# Patient Record
Sex: MALE | Race: ASIAN | NOT HISPANIC OR LATINO
[De-identification: names, ages, dates, MRNs, and addresses within clinical notes are randomized per-mention and may not be internally consistent; named-entity substitution may affect disease eponyms.]

---

## 2021-12-30 PROBLEM — Z00.00 ENCOUNTER FOR PREVENTIVE HEALTH EXAMINATION: Status: ACTIVE | Noted: 2021-12-30

## 2022-01-05 ENCOUNTER — APPOINTMENT (OUTPATIENT)
Dept: ORTHOPEDIC SURGERY | Facility: CLINIC | Age: 72
End: 2022-01-05
Payer: MEDICARE

## 2022-01-05 ENCOUNTER — TRANSCRIPTION ENCOUNTER (OUTPATIENT)
Age: 72
End: 2022-01-05

## 2022-01-05 VITALS
HEIGHT: 69 IN | DIASTOLIC BLOOD PRESSURE: 90 MMHG | HEART RATE: 79 BPM | SYSTOLIC BLOOD PRESSURE: 150 MMHG | BODY MASS INDEX: 28.14 KG/M2 | OXYGEN SATURATION: 96 % | WEIGHT: 190 LBS

## 2022-01-05 DIAGNOSIS — Z78.9 OTHER SPECIFIED HEALTH STATUS: ICD-10-CM

## 2022-01-05 DIAGNOSIS — Z86.79 PERSONAL HISTORY OF OTHER DISEASES OF THE CIRCULATORY SYSTEM: ICD-10-CM

## 2022-01-05 DIAGNOSIS — Z86.39 PERSONAL HISTORY OF OTHER ENDOCRINE, NUTRITIONAL AND METABOLIC DISEASE: ICD-10-CM

## 2022-01-05 PROCEDURE — 72170 X-RAY EXAM OF PELVIS: CPT

## 2022-01-05 PROCEDURE — 73564 X-RAY EXAM KNEE 4 OR MORE: CPT | Mod: LT,RT

## 2022-01-05 PROCEDURE — 99204 OFFICE O/P NEW MOD 45 MIN: CPT

## 2022-01-05 RX ORDER — ASPIRIN 325 MG/1
TABLET, FILM COATED ORAL
Refills: 0 | Status: ACTIVE | COMMUNITY

## 2022-01-05 RX ORDER — ENALAPRIL MALEATE 5 MG/1
TABLET ORAL
Refills: 0 | Status: ACTIVE | COMMUNITY

## 2022-01-05 RX ORDER — ALLOPURINOL 200 MG/1
TABLET ORAL
Refills: 0 | Status: ACTIVE | COMMUNITY

## 2022-01-05 RX ORDER — FINASTERIDE 1 MG/1
TABLET ORAL
Refills: 0 | Status: ACTIVE | COMMUNITY

## 2022-01-05 RX ORDER — ROSUVASTATIN CALCIUM 5 MG/1
TABLET, FILM COATED ORAL
Refills: 0 | Status: ACTIVE | COMMUNITY

## 2022-01-05 NOTE — DISCUSSION/SUMMARY
[de-identified] : 72y/o male with R > L knee osteoarthritis\par - Begin PT\par - HEP encouraged\par - Tylenol + meloxicam as needed\par - We discussed CSI vs. HA injections; he declined this visit\par - Explained that due to his advanced OA, he is not a candidate for meniscal transplantation or ACI\par - RTC 2mo, no new XRs needed. Reconsider injections if not sufficiently improved after conservative tx

## 2022-01-05 NOTE — HISTORY OF PRESENT ILLNESS
[de-identified] : 1/5/22: 72y/o male presenting for evaluation of R > L knee pain. Symptoms present for about 8 years with exacerbation in the past 3 weeks without injury or other inciting event. Pain is mostly medial at the right knee, worst in the mornings and fades to a mild soreness over the course of the day. Treatments attempted thus far have included a soft right knee brace, heating patches, and HEP consisting of gym-based light weightlifting a couple of times per week. Reports unlimited ambulatory tolerance without assistive device, though he needs to take his time on longer walks. Retired last month. History of right knee arthroscopy 8 years ago for some kind of meniscal lesion. He is interested in discussing "cutting-edge" technologies like meniscal transplantation and autologous chondrocyte grafting.

## 2022-01-05 NOTE — PHYSICAL EXAM
[de-identified] : General appearance: well nourished and hydrated, pleasant, alert and oriented x 3, cooperative.  \par HEENT: normocephalic, EOM intact, wearing mask, external auditory canal clear.  \par Cardiovascular: no lower leg edema, no varicosities, dorsalis pedis pulses palpable and symmetric.  \par Lymphatics: no palpable lymphadenopathy, no lymphedema.  \par Neurologic: sensation is normal, no muscle weakness in upper or lower extremities, patella tendon reflexes present and symmetric.  \par Dermatologic: skin moist, warm, no rash.  \par Spine: cervical spine with normal lordosis and painless range of motion, thoracic spine with normal kyphosis and painless range of motion, lumbosacral spine with normal lordosis and painless range of motion.  No tenderness to palpation along midline spine and paraspinal musculature.  Sacroiliac joints nontender bilaterally. Negative SLR and crossed SLR tests bilaterally.\par Gait: mild right varus thrust.\par \par Left knee:\par - Soft tissue swelling: none\par - Ecchymosis: none\par - Erythema: none\par - Effusion: small\par - Wounds: none\par - Alignment: normal\par - Tenderness: mild medial and lateral joint lines\par - ROM: 0-130\par - Collateral laxity: none\par - Cruciate laxity: none\par - Popliteal angle (degrees): 45\par - Quad strength: 5/5\par \par Right knee:\par - Soft tissue swelling: mild\par - Ecchymosis: none\par - Erythema: none\par - Effusion: small\par - Wounds: none\par - Alignment: partially correctable varus\par - Tenderness: medial and lateral joint lines\par - ROM: 0-125\par - Collateral laxity: none\par - Cruciate laxity: none\par - Popliteal angle (degrees): 45\par - Quad strength: 5/5 [de-identified] : AP pelvis and 4 views of the bilateral knees (weightbearing AP, weightbearing Cannon, weightbearing lateral, and Sunrise) were interpreted by me and reviewed with the patient.\par \par Location of imaging: [facility] Date of exam: 1/5/22\par \par Pelvic alignment: normal\par \par Right hip --\par Alignment: normal\par Arthritis: no joint space narrowing\par Deformity: none\par Osteonecrosis: multiple small cysts noted in femoral head\par \par Left hip --\par Alignment: normal\par Arthritis: no joint space narrowing\par Deformity: cam\par Osteonecrosis: multiple moderately sized cysts noted at femoral head/neck junction\par \par Right knee -- distal metaphyseal femoral lesion likely consistent with old fibrous lesion\par Alignment: mild varus\par Arthritis: tricompartmental worst medial, KL3-4\par Patellar height: normal\par Patellar tracking: central\par \par Left knee --\par Alignment: mild varus\par Arthritis: tricompartmental worst medial, KL3\par Patellar height: normal\par Patellar tracking: central

## 2022-04-07 ENCOUNTER — NON-APPOINTMENT (OUTPATIENT)
Age: 72
End: 2022-04-07

## 2022-04-11 ENCOUNTER — NON-APPOINTMENT (OUTPATIENT)
Age: 72
End: 2022-04-11

## 2022-05-04 ENCOUNTER — APPOINTMENT (OUTPATIENT)
Dept: ORTHOPEDIC SURGERY | Facility: CLINIC | Age: 72
End: 2022-05-04
Payer: MEDICARE

## 2022-05-04 VITALS
TEMPERATURE: 98 F | HEIGHT: 69 IN | WEIGHT: 190 LBS | BODY MASS INDEX: 28.14 KG/M2 | DIASTOLIC BLOOD PRESSURE: 93 MMHG | SYSTOLIC BLOOD PRESSURE: 155 MMHG | OXYGEN SATURATION: 99 % | HEART RATE: 80 BPM

## 2022-05-04 PROCEDURE — 99214 OFFICE O/P EST MOD 30 MIN: CPT

## 2022-05-04 NOTE — DISCUSSION/SUMMARY
[de-identified] : 71y/o male with R > L knee osteoarthritis\par - Cont OPT\par - HEP encouraged\par - Tylenol + meloxicam as needed\par - Order right knee HA injections\par - Callback with authorization and schedule per his convenience\par - He plans to go to [facility] for 18 months starting at the end of the year for immigration purposes. May want to pursue R TKA prior to departure but will depend on clinical progress following the HA.

## 2022-05-04 NOTE — HISTORY OF PRESENT ILLNESS
[de-identified] : 5/4/22: Feels stronger in the right knee following PT and HEP. Has not been using Tylenol or meloxicam. Feels mild pain in the mornings but otherwise denies pain. Feels more "soreness" but mostly complains of inability to descend stairs with the right knee due to buckling. Braces have not helped in the past and he is not interested in trying any others. Would like to explore FOSTER at this point.\par \par 1/5/22: 72y/o male presenting for evaluation of R > L knee pain. Symptoms present for about 8 years with exacerbation in the past 3 weeks without injury or other inciting event. Pain is mostly medial at the right knee, worst in the mornings and fades to a mild soreness over the course of the day. Treatments attempted thus far have included a soft right knee brace, heating patches, and HEP consisting of gym-based light weightlifting a couple of times per week. Reports unlimited ambulatory tolerance without assistive device, though he needs to take his time on longer walks. Retired last month. History of right knee arthroscopy 8 years ago for some kind of meniscal lesion. He is interested in discussing "cutting-edge" technologies like meniscal transplantation and autologous chondrocyte grafting.

## 2022-05-04 NOTE — PHYSICAL EXAM
[de-identified] : General appearance: well nourished and hydrated, pleasant, alert and oriented x 3, cooperative.  \par HEENT: normocephalic, EOM intact, wearing mask, external auditory canal clear.  \par Cardiovascular: no lower leg edema, no varicosities, dorsalis pedis pulses palpable and symmetric.  \par Lymphatics: no palpable lymphadenopathy, no lymphedema.  \par Neurologic: sensation is normal, no muscle weakness in upper or lower extremities, patella tendon reflexes present and symmetric.  \par Dermatologic: skin moist, warm, no rash.  \par Spine: cervical spine with normal lordosis and painless range of motion, thoracic spine with normal kyphosis and painless range of motion, lumbosacral spine with normal lordosis and painless range of motion.  No tenderness to palpation along midline spine and paraspinal musculature.  Sacroiliac joints nontender bilaterally. Negative SLR and crossed SLR tests bilaterally.\par Gait: mild right varus thrust.\par \par Left knee:\par - Soft tissue swelling: none\par - Ecchymosis: none\par - Erythema: none\par - Effusion: small\par - Wounds: none\par - Alignment: normal\par - Tenderness: mild medial and lateral joint lines\par - ROM: 0-130\par - Collateral laxity: none\par - Cruciate laxity: none\par - Popliteal angle (degrees): 45\par - Quad strength: 5/5\par \par Right knee:\par - Soft tissue swelling: mild\par - Ecchymosis: none\par - Erythema: none\par - Effusion: small\par - Wounds: none\par - Alignment: partially correctable varus\par - Tenderness: medial and lateral joint lines\par - ROM: 0-125\par - Collateral laxity: none\par - Cruciate laxity: none\par - Popliteal angle (degrees): 45\par - Quad strength: 5/5

## 2022-05-25 ENCOUNTER — APPOINTMENT (OUTPATIENT)
Dept: ORTHOPEDIC SURGERY | Facility: CLINIC | Age: 72
End: 2022-05-25
Payer: MEDICARE

## 2022-05-25 PROCEDURE — 20610 DRAIN/INJ JOINT/BURSA W/O US: CPT

## 2022-05-30 NOTE — PROCEDURE
[de-identified] : Monovisc Injection\par Bilateral knees\par Lot.No: [id number]\par Exp.Date: 2024-06-30\par \par Procedure: Knee joint injection\par Laterality: bilateral\par Indication: Osteoarthritis - discussed with patient\par Skin prep: alcohol and chlorhexidine\par Anesthesia: ethyl chloride spray\par Needle: 20-gauge\par Portal: superolateral\par Aspiration: none\par Injectate: Monovisc as above\par Dressing: Band-aid\par Complications: None\par

## 2022-09-02 ENCOUNTER — APPOINTMENT (OUTPATIENT)
Dept: ORTHOPEDIC SURGERY | Facility: CLINIC | Age: 72
End: 2022-09-02

## 2022-09-02 PROCEDURE — 99214 OFFICE O/P EST MOD 30 MIN: CPT

## 2022-09-05 NOTE — DISCUSSION/SUMMARY
[de-identified] : 73y/o male with R > L knee osteoarthritis\par - He is indicated for right total knee arthroplasty with Kirsten robotic assistance\par - We discussed the details of the procedure, the expected recovery period, and the expected outcome. We discussed the likelihood of satisfaction after complete recovery, and the potential causes of dissatisfaction. The importance of active patient participation in the rehabilitation protocol was emphasized, along with its influence on short and long-term outcomes. Specific risks of total knee replacement were discussed in detail. We discussed the risk of surgical site complications including but not limited to: surgical site infection, wound healing complications, bone fracture, tendon or ligament injury, neurovascular injury, hemorrhage, postoperative stiffness or instability, persistent pain and need for reoperation or manipulation under anesthesia. We discussed surgical blood loss and the possible need for blood transfusion. We discussed the risk of perioperative medical complications, including but not limited to catheter-associated urinary tract infection, venous thromboembolism and other cardiopulmonary complications. We discussed anesthetic options and the risk of anesthesia-related complications. We discussed implant fixation methods; my plan would be to use fully cemented fixation in this case. We discussed the variable need to resurface the patella; my plan would be to resurface in this case. We discussed the durability of prosthetic knees and limitations related to wear, osteolysis and loosening.  We discussed the role of the robot in helping to guide bone resections and measure alignment and soft tissue balance. All questions were answered the patient's satisfaction. The patient was given a copy of my preoperative packet with additional information about the procedure. I asked the patient to either call back or schedule a followup appointment for any additional questions or concerns regarding the procedure.\par - Advised him to postpone his flight until at least 6 weeks have elapsed from surgery; we reviewed increased risk of VTE with arthroplasty and extended air travel\par - Book for surgery ASAP with standard medical clearance
standing

## 2022-09-05 NOTE — PHYSICAL EXAM
[de-identified] : General appearance: well nourished and hydrated, pleasant, alert and oriented x 3, cooperative.  \par HEENT: normocephalic, EOM intact, wearing mask, external auditory canal clear.  \par Cardiovascular: no lower leg edema, no varicosities, dorsalis pedis pulses palpable and symmetric.  \par Lymphatics: no palpable lymphadenopathy, no lymphedema.  \par Neurologic: sensation is normal, no muscle weakness in upper or lower extremities, patella tendon reflexes present and symmetric.  \par Dermatologic: skin moist, warm, no rash.  \par Spine: cervical spine with normal lordosis and painless range of motion, thoracic spine with normal kyphosis and painless range of motion, lumbosacral spine with normal lordosis and painless range of motion.  No tenderness to palpation along midline spine and paraspinal musculature.  Sacroiliac joints nontender bilaterally. Negative SLR and crossed SLR tests bilaterally.\par Gait: mild right varus thrust.\par \par Left knee:\par - Soft tissue swelling: none\par - Ecchymosis: none\par - Erythema: none\par - Effusion: small\par - Wounds: none\par - Alignment: normal\par - Tenderness: mild medial and lateral joint lines\par - ROM: 0-130\par - Collateral laxity: none\par - Cruciate laxity: none\par - Popliteal angle (degrees): 45\par - Quad strength: 5/5\par \par Right knee:\par - Soft tissue swelling: mild\par - Ecchymosis: none\par - Erythema: none\par - Effusion: small\par - Wounds: none\par - Alignment: partially correctable varus\par - Tenderness: medial and lateral joint lines\par - ROM: 0-125\par - Collateral laxity: none\par - Cruciate laxity: none\par - Popliteal angle (degrees): 45\par - Quad strength: 5/5

## 2022-09-05 NOTE — HISTORY OF PRESENT ILLNESS
[de-identified] : 9/2/22: Following up for right knee osteoarthritis. Notes very little relief (1 week) from the last HA injection. Seeking R TKA now. Still planning to leave for [facility] in November, though the date is still potentially somewhat flexible. He notes that he will not have any medical coverage for the first 6 months in [facility], and that he will be compelled to remain there for those first 6 months uninterrupted to gain permanent resident status there. \par \par 5/4/22: Feels stronger in the right knee following PT and HEP. Has not been using Tylenol or meloxicam. Feels mild pain in the mornings but otherwise denies pain. Feels more "soreness" but mostly complains of inability to descend stairs with the right knee due to buckling. Braces have not helped in the past and he is not interested in trying any others. Would like to explore FOSTER at this point.\par \par 1/5/22: 70y/o male presenting for evaluation of R > L knee pain. Symptoms present for about 8 years with exacerbation in the past 3 weeks without injury or other inciting event. Pain is mostly medial at the right knee, worst in the mornings and fades to a mild soreness over the course of the day. Treatments attempted thus far have included a soft right knee brace, heating patches, and HEP consisting of gym-based light weightlifting a couple of times per week. Reports unlimited ambulatory tolerance without assistive device, though he needs to take his time on longer walks. Retired last month. History of right knee arthroscopy 8 years ago for some kind of meniscal lesion. He is interested in discussing "cutting-edge" technologies like meniscal transplantation and autologous chondrocyte grafting.

## 2022-09-14 ENCOUNTER — TRANSCRIPTION ENCOUNTER (OUTPATIENT)
Age: 72
End: 2022-09-14

## 2022-09-14 VITALS
HEIGHT: 69 IN | TEMPERATURE: 97 F | SYSTOLIC BLOOD PRESSURE: 150 MMHG | HEART RATE: 76 BPM | RESPIRATION RATE: 18 BRPM | OXYGEN SATURATION: 97 % | WEIGHT: 189.6 LBS | DIASTOLIC BLOOD PRESSURE: 88 MMHG

## 2022-09-14 RX ORDER — INFLUENZA VIRUS VACCINE 15; 15; 15; 15 UG/.5ML; UG/.5ML; UG/.5ML; UG/.5ML
0.7 SUSPENSION INTRAMUSCULAR ONCE
Refills: 0 | Status: DISCONTINUED | OUTPATIENT
Start: 2022-09-15 | End: 2022-09-17

## 2022-09-14 RX ORDER — ACETAMINOPHEN 500 MG/1
500 TABLET ORAL
Qty: 180 | Refills: 2 | Status: ACTIVE | COMMUNITY
Start: 2022-09-14 | End: 1900-01-01

## 2022-09-14 RX ORDER — POVIDONE-IODINE 5 %
1 AEROSOL (ML) TOPICAL ONCE
Refills: 0 | Status: COMPLETED | OUTPATIENT
Start: 2022-09-15 | End: 2022-09-15

## 2022-09-14 RX ORDER — ASPIRIN 81 MG/1
81 TABLET ORAL
Qty: 60 | Refills: 0 | Status: ACTIVE | COMMUNITY
Start: 2022-09-14 | End: 1900-01-01

## 2022-09-14 NOTE — H&P ADULT - NSICDXPASTMEDICALHX_GEN_ALL_CORE_FT
PAST MEDICAL HISTORY:  CAD (coronary artery disease)     HLD (hyperlipidemia)     HTN (hypertension)     Stroke

## 2022-09-14 NOTE — H&P ADULT - HISTORY OF PRESENT ILLNESS
72M with right knee pain x    History of CAD with PCI. History of TIA/stroke. Taking ASA 81mg.    Presents for elective R TKR. 72yoM with right knee pain x 1y. Pt. states he had a meniscus repair in 2010 and feels like knee has gotten worse since then. Pt. wakes up in pain, but has tried conservative treatments including physical therapy (15-20 sessions) which helped strengthen his legs. Pt. also had one knee injection from Dr. Zuniga. Denies any numbness/tingling in b/l les. Denies any walker assistance. History of CAD with PCI. History of TIA/stroke. Taking ASA 81mg.  Presents for elective Right TKR.

## 2022-09-14 NOTE — H&P ADULT - NSHPPHYSICALEXAM_GEN_ALL_CORE
Gen: 72M, NAD  MSK: Decreased right knee ROM secondary to pain    Rest of PE per MD clearance Gen: 72M, NAD  MSK: Right LE skin intact, no erythema/ecchymosis/sts. SLT INTACT, DP/PT 2+, EHL/TA/GS 5/5. Decreased right knee ROM secondary to pain    Rest of PE per MD clearance

## 2022-09-14 NOTE — H&P ADULT - PROBLEM SELECTOR PLAN 1
Admit to Orthopedic Service for elective R TKR    Medically cleared and optimized for surgery by Dr. Dawn

## 2022-09-14 NOTE — H&P ADULT - NSHPLABSRESULTS_GEN_ALL_CORE
Preop CBC, BMP, PT/INR, PTT within normal range and reviewed per medical clearance  Cr- 1.07  UA: WNL  Preop CXR within normal limits and reviewed per medical clearance   Preop EKG within normal limits and reviewed per medical clearance; 74bpm  3M: ROBBIN  COVID: neg, 9/12

## 2022-09-14 NOTE — PRE-OP CHECKLIST - NOTHING BY MOUTH SINCE
Called and left a voicemail for dad letting him know that I spoke with the lab and they do have enough blood to run the labs.  Advised we would call when we have the results.   14-Sep-2022 23:00

## 2022-09-15 ENCOUNTER — APPOINTMENT (OUTPATIENT)
Dept: ORTHOPEDIC SURGERY | Facility: HOSPITAL | Age: 72
End: 2022-09-15

## 2022-09-15 ENCOUNTER — TRANSCRIPTION ENCOUNTER (OUTPATIENT)
Age: 72
End: 2022-09-15

## 2022-09-15 ENCOUNTER — INPATIENT (INPATIENT)
Facility: HOSPITAL | Age: 72
LOS: 1 days | Discharge: HOME | DRG: 470 | End: 2022-09-17
Attending: ORTHOPAEDIC SURGERY | Admitting: ORTHOPAEDIC SURGERY
Payer: MEDICARE

## 2022-09-15 DIAGNOSIS — Z98.890 OTHER SPECIFIED POSTPROCEDURAL STATES: Chronic | ICD-10-CM

## 2022-09-15 DIAGNOSIS — I10 ESSENTIAL (PRIMARY) HYPERTENSION: ICD-10-CM

## 2022-09-15 DIAGNOSIS — I25.10 ATHEROSCLEROTIC HEART DISEASE OF NATIVE CORONARY ARTERY WITHOUT ANGINA PECTORIS: ICD-10-CM

## 2022-09-15 DIAGNOSIS — I63.9 CEREBRAL INFARCTION, UNSPECIFIED: ICD-10-CM

## 2022-09-15 DIAGNOSIS — Z98.49 CATARACT EXTRACTION STATUS, UNSPECIFIED EYE: Chronic | ICD-10-CM

## 2022-09-15 DIAGNOSIS — M17.11 UNILATERAL PRIMARY OSTEOARTHRITIS, RIGHT KNEE: ICD-10-CM

## 2022-09-15 DIAGNOSIS — E78.5 HYPERLIPIDEMIA, UNSPECIFIED: ICD-10-CM

## 2022-09-15 LAB
APTT BLD: 36 SEC — HIGH (ref 27.5–35.5)
INR BLD: 0.95 — SIGNIFICANT CHANGE UP (ref 0.88–1.16)
PROTHROM AB SERPL-ACNC: 11.3 SEC — SIGNIFICANT CHANGE UP (ref 10.5–13.4)

## 2022-09-15 PROCEDURE — S2900 ROBOTIC SURGICAL SYSTEM: CPT | Mod: NC

## 2022-09-15 PROCEDURE — 73560 X-RAY EXAM OF KNEE 1 OR 2: CPT | Mod: 26,RT

## 2022-09-15 PROCEDURE — 27447 TOTAL KNEE ARTHROPLASTY: CPT | Mod: RT

## 2022-09-15 DEVICE — STEM TIB PSN 5 DEG SZ F R: Type: IMPLANTABLE DEVICE | Site: RIGHT | Status: FUNCTIONAL

## 2022-09-15 DEVICE — PATELLA PERSONA VE CEMENTED 35MM: Type: IMPLANTABLE DEVICE | Site: RIGHT | Status: FUNCTIONAL

## 2022-09-15 DEVICE — FEM PERSONA PS CMT CCR STD SZ 9 R: Type: IMPLANTABLE DEVICE | Site: RIGHT | Status: FUNCTIONAL

## 2022-09-15 DEVICE — SURF ART PERSONA RT 6-9 EF 11MM: Type: IMPLANTABLE DEVICE | Site: RIGHT | Status: FUNCTIONAL

## 2022-09-15 DEVICE — CEMENT PALACOS R: Type: IMPLANTABLE DEVICE | Site: RIGHT | Status: FUNCTIONAL

## 2022-09-15 DEVICE — ZIMMER/NEXGEN SMOOTH PIN 3.2X75MM: Type: IMPLANTABLE DEVICE | Site: RIGHT | Status: FUNCTIONAL

## 2022-09-15 DEVICE — PIN FIX CAS 3.2X80MM STR: Type: IMPLANTABLE DEVICE | Site: RIGHT | Status: FUNCTIONAL

## 2022-09-15 DEVICE — ZIMMER FEMALE HEX SCREW MAGNETIC 2.5MM X 25MM: Type: IMPLANTABLE DEVICE | Site: RIGHT | Status: FUNCTIONAL

## 2022-09-15 DEVICE — PIN CAS FIX 3.2X150MM: Type: IMPLANTABLE DEVICE | Site: RIGHT | Status: FUNCTIONAL

## 2022-09-15 RX ORDER — ACETAMINOPHEN 500 MG
1000 TABLET ORAL ONCE
Refills: 0 | Status: COMPLETED | OUTPATIENT
Start: 2022-09-15 | End: 2022-09-15

## 2022-09-15 RX ORDER — OXYCODONE HYDROCHLORIDE 5 MG/1
10 TABLET ORAL EVERY 4 HOURS
Refills: 0 | Status: DISCONTINUED | OUTPATIENT
Start: 2022-09-15 | End: 2022-09-17

## 2022-09-15 RX ORDER — LANOLIN ALCOHOL/MO/W.PET/CERES
5 CREAM (GRAM) TOPICAL AT BEDTIME
Refills: 0 | Status: DISCONTINUED | OUTPATIENT
Start: 2022-09-15 | End: 2022-09-17

## 2022-09-15 RX ORDER — OXYCODONE HYDROCHLORIDE 5 MG/1
5 TABLET ORAL EVERY 4 HOURS
Refills: 0 | Status: DISCONTINUED | OUTPATIENT
Start: 2022-09-15 | End: 2022-09-17

## 2022-09-15 RX ORDER — MAGNESIUM HYDROXIDE 400 MG/1
30 TABLET, CHEWABLE ORAL DAILY
Refills: 0 | Status: DISCONTINUED | OUTPATIENT
Start: 2022-09-15 | End: 2022-09-17

## 2022-09-15 RX ORDER — CHLORHEXIDINE GLUCONATE 213 G/1000ML
1 SOLUTION TOPICAL ONCE
Refills: 0 | Status: COMPLETED | OUTPATIENT
Start: 2022-09-15 | End: 2022-09-15

## 2022-09-15 RX ORDER — SODIUM CHLORIDE 9 MG/ML
1000 INJECTION, SOLUTION INTRAVENOUS
Refills: 0 | Status: DISCONTINUED | OUTPATIENT
Start: 2022-09-16 | End: 2022-09-17

## 2022-09-15 RX ORDER — ACETAMINOPHEN 500 MG
975 TABLET ORAL EVERY 8 HOURS
Refills: 0 | Status: DISCONTINUED | OUTPATIENT
Start: 2022-09-15 | End: 2022-09-17

## 2022-09-15 RX ORDER — ALLOPURINOL 300 MG
1 TABLET ORAL
Qty: 0 | Refills: 0 | DISCHARGE

## 2022-09-15 RX ORDER — PANTOPRAZOLE SODIUM 20 MG/1
40 TABLET, DELAYED RELEASE ORAL
Refills: 0 | Status: DISCONTINUED | OUTPATIENT
Start: 2022-09-15 | End: 2022-09-17

## 2022-09-15 RX ORDER — ROSUVASTATIN CALCIUM 5 MG/1
1 TABLET ORAL
Qty: 0 | Refills: 0 | DISCHARGE

## 2022-09-15 RX ORDER — FINASTERIDE 5 MG/1
1 TABLET, FILM COATED ORAL
Qty: 0 | Refills: 0 | DISCHARGE

## 2022-09-15 RX ORDER — POLYETHYLENE GLYCOL 3350 17 G/17G
17 POWDER, FOR SOLUTION ORAL AT BEDTIME
Refills: 0 | Status: DISCONTINUED | OUTPATIENT
Start: 2022-09-15 | End: 2022-09-17

## 2022-09-15 RX ORDER — TRAMADOL HYDROCHLORIDE 50 MG/1
50 TABLET ORAL EVERY 6 HOURS
Refills: 0 | Status: DISCONTINUED | OUTPATIENT
Start: 2022-09-15 | End: 2022-09-17

## 2022-09-15 RX ORDER — APREPITANT 80 MG/1
40 CAPSULE ORAL ONCE
Refills: 0 | Status: COMPLETED | OUTPATIENT
Start: 2022-09-15 | End: 2022-09-15

## 2022-09-15 RX ORDER — CELECOXIB 200 MG/1
400 CAPSULE ORAL ONCE
Refills: 0 | Status: COMPLETED | OUTPATIENT
Start: 2022-09-15 | End: 2022-09-15

## 2022-09-15 RX ORDER — SENNA PLUS 8.6 MG/1
2 TABLET ORAL AT BEDTIME
Refills: 0 | Status: DISCONTINUED | OUTPATIENT
Start: 2022-09-15 | End: 2022-09-17

## 2022-09-15 RX ORDER — ASPIRIN/CALCIUM CARB/MAGNESIUM 324 MG
81 TABLET ORAL
Refills: 0 | Status: DISCONTINUED | OUTPATIENT
Start: 2022-09-16 | End: 2022-09-17

## 2022-09-15 RX ORDER — HYDROMORPHONE HYDROCHLORIDE 2 MG/ML
0.5 INJECTION INTRAMUSCULAR; INTRAVENOUS; SUBCUTANEOUS
Refills: 0 | Status: DISCONTINUED | OUTPATIENT
Start: 2022-09-15 | End: 2022-09-17

## 2022-09-15 RX ORDER — ONDANSETRON 8 MG/1
4 TABLET, FILM COATED ORAL EVERY 6 HOURS
Refills: 0 | Status: DISCONTINUED | OUTPATIENT
Start: 2022-09-15 | End: 2022-09-17

## 2022-09-15 RX ORDER — HYDROMORPHONE HYDROCHLORIDE 2 MG/ML
0.5 INJECTION INTRAMUSCULAR; INTRAVENOUS; SUBCUTANEOUS EVERY 4 HOURS
Refills: 0 | Status: DISCONTINUED | OUTPATIENT
Start: 2022-09-15 | End: 2022-09-17

## 2022-09-15 RX ORDER — KETOROLAC TROMETHAMINE 30 MG/ML
15 SYRINGE (ML) INJECTION EVERY 6 HOURS
Refills: 0 | Status: DISCONTINUED | OUTPATIENT
Start: 2022-09-15 | End: 2022-09-16

## 2022-09-15 RX ORDER — CEFAZOLIN SODIUM 1 G
2000 VIAL (EA) INJECTION EVERY 8 HOURS
Refills: 0 | Status: COMPLETED | OUTPATIENT
Start: 2022-09-15 | End: 2022-09-16

## 2022-09-15 RX ORDER — CELECOXIB 200 MG/1
200 CAPSULE ORAL EVERY 12 HOURS
Refills: 0 | Status: DISCONTINUED | OUTPATIENT
Start: 2022-09-16 | End: 2022-09-17

## 2022-09-15 RX ADMIN — Medication 15 MILLIGRAM(S): at 18:05

## 2022-09-15 RX ADMIN — Medication 100 MILLIGRAM(S): at 19:14

## 2022-09-15 RX ADMIN — Medication 1000 MILLIGRAM(S): at 09:28

## 2022-09-15 RX ADMIN — Medication 1 APPLICATION(S): at 09:29

## 2022-09-15 RX ADMIN — CHLORHEXIDINE GLUCONATE 1 APPLICATION(S): 213 SOLUTION TOPICAL at 09:28

## 2022-09-15 RX ADMIN — APREPITANT 40 MILLIGRAM(S): 80 CAPSULE ORAL at 09:28

## 2022-09-15 RX ADMIN — CELECOXIB 400 MILLIGRAM(S): 200 CAPSULE ORAL at 09:29

## 2022-09-15 NOTE — PRE-ANESTHESIA EVALUATION ADULT - NSANTHADDINFOFT_GEN_ALL_CORE
GA vs Spinal. Will repeat PTT/INR today. If still elevated will proceed with GA. If normal will proceed with Spinal.  PNB for postop pain.   R/B/A d/w patient including risks of parasthesia, HA, and nerve injury. All questions answered. GA vs Spinal. Will repeat PTT/INR today. If still elevated will proceed with GA. If normal will proceed with Spinal.  PNB for postop pain.   R/B/A d/w patient including risks of parasthesia, HA, and nerve injury. All questions answered.    Repeat PTT 36 (normal range 27.5-35.5). Given no history of bleeding problems and PTT stable, will proceed with Spinal.

## 2022-09-15 NOTE — PHYSICAL THERAPY INITIAL EVALUATION ADULT - ADDITIONAL COMMENTS
24-Feb-2018 19:28
Patient lives with spouse in elevator accessible apartment. Does not use any Assistive Devices at baseline. Owns SC.

## 2022-09-15 NOTE — PROGRESS NOTE ADULT - SUBJECTIVE AND OBJECTIVE BOX
Ortho Post Op Check    Procedure: R TKA (robotically assisted)  Surgeon: Dr. Zuniga    Pt comfortable without complaints, pain controlled  Denies CP, SOB, N/V, numbness/tingling     Vital Signs Last 24 Hrs  T(C): --  T(F): --  HR: --  BP: --  BP(mean): --  RR: --  SpO2: --  I&O's Summary      General: Pt Alert and oriented, NAD  DSG C/D/I: Prineo with Aquacel   Pulses: 2+ DP/PT  Sensation: SILT LE bilat   Motor: Quad/Ham/EHL/FHL/TA/GS 5/5      Post-op X-Ray: R knee prosthesis in good anatomic alignment with no complication     A/P: 72yMale POD#0 s/p R TKA (robotically assisted)   - Stable  - Pain Control: IV and PO  - DVT ppx: ASA 81mg bid  - Post op abx: Anecf  - PT, WBS: WBAT RLE     Ortho Pager [phone] Ortho Post Op Check    Procedure: R TKA (robotically assisted)  Surgeon: Dr. Zuniga    Pt comfortable without complaints, pain controlled  Denies CP, SOB, N/V, numbness/tingling     Vital Signs Last 24 Hrs  T(C): --  T(F): --  HR: --  BP: --  BP(mean): --  RR: --  SpO2: --  I&O's Summary      General: Pt Alert and oriented, NAD  DSG C/D/I: Prineo with Aquacel   Pulses: 2+ DP/PT  Sensation: SILT LE bilat   Motor: Quad/Ham/EHL/FHL/TA/GS 5/5      Post-op X-Ray: R knee prosthesis in good anatomic alignment with no complication     A/P: 72yMale POD#1 s/p R TKA (robotically assisted)   - Stable  - Pain Control: IV and PO  - DVT ppx: ASA 81mg bid  - Post op abx: Anecf  - PT, WBS: WBAT RLE     Ortho Pager [phone]

## 2022-09-15 NOTE — PHYSICAL THERAPY INITIAL EVALUATION ADULT - GENERAL OBSERVATIONS, REHAB EVAL
Patient received semi-byrd in bed  in NAD on RA, +SCDs, +PIV, +Cryocuff. Cleared by JUAQUIN Miles. Agreeable to PT.

## 2022-09-15 NOTE — PHYSICAL THERAPY INITIAL EVALUATION ADULT - PERTINENT HX OF CURRENT PROBLEM, REHAB EVAL
72yoM with right knee pain x 1y. Pt. states he had a meniscus repair in 2010 and feels like knee has gotten worse since then. Pt. wakes up in pain, but has tried conservative treatments including physical therapy (15-20 sessions) which helped strengthen his legs. Pt. also had one knee injection from Dr. Zuniga. Denies any numbness/tingling in b/l les. Denies any walker assistance. History of CAD with PCI. History of TIA/stroke.

## 2022-09-16 ENCOUNTER — TRANSCRIPTION ENCOUNTER (OUTPATIENT)
Age: 72
End: 2022-09-16

## 2022-09-16 LAB
ANION GAP SERPL CALC-SCNC: 7 MMOL/L — SIGNIFICANT CHANGE UP (ref 5–17)
BUN SERPL-MCNC: 25 MG/DL — HIGH (ref 7–23)
CALCIUM SERPL-MCNC: 8.9 MG/DL — SIGNIFICANT CHANGE UP (ref 8.4–10.5)
CHLORIDE SERPL-SCNC: 101 MMOL/L — SIGNIFICANT CHANGE UP (ref 96–108)
CO2 SERPL-SCNC: 28 MMOL/L — SIGNIFICANT CHANGE UP (ref 22–31)
CREAT SERPL-MCNC: 1.15 MG/DL — SIGNIFICANT CHANGE UP (ref 0.5–1.3)
EGFR: 68 ML/MIN/1.73M2 — SIGNIFICANT CHANGE UP
GLUCOSE SERPL-MCNC: 142 MG/DL — HIGH (ref 70–99)
HCT VFR BLD CALC: 37.3 % — LOW (ref 39–50)
HCV AB S/CO SERPL IA: 0.04 S/CO — SIGNIFICANT CHANGE UP
HCV AB SERPL-IMP: SIGNIFICANT CHANGE UP
HGB BLD-MCNC: 12.3 G/DL — LOW (ref 13–17)
MCHC RBC-ENTMCNC: 30.7 PG — SIGNIFICANT CHANGE UP (ref 27–34)
MCHC RBC-ENTMCNC: 33 GM/DL — SIGNIFICANT CHANGE UP (ref 32–36)
MCV RBC AUTO: 93 FL — SIGNIFICANT CHANGE UP (ref 80–100)
NRBC # BLD: 0 /100 WBCS — SIGNIFICANT CHANGE UP (ref 0–0)
PLATELET # BLD AUTO: 179 K/UL — SIGNIFICANT CHANGE UP (ref 150–400)
POTASSIUM SERPL-MCNC: 4.3 MMOL/L — SIGNIFICANT CHANGE UP (ref 3.5–5.3)
POTASSIUM SERPL-SCNC: 4.3 MMOL/L — SIGNIFICANT CHANGE UP (ref 3.5–5.3)
RBC # BLD: 4.01 M/UL — LOW (ref 4.2–5.8)
RBC # FLD: 13.2 % — SIGNIFICANT CHANGE UP (ref 10.3–14.5)
SODIUM SERPL-SCNC: 136 MMOL/L — SIGNIFICANT CHANGE UP (ref 135–145)
WBC # BLD: 12.09 K/UL — HIGH (ref 3.8–10.5)
WBC # FLD AUTO: 12.09 K/UL — HIGH (ref 3.8–10.5)

## 2022-09-16 RX ORDER — ACETAMINOPHEN 500 MG
3 TABLET ORAL
Qty: 0 | Refills: 0 | DISCHARGE
Start: 2022-09-16

## 2022-09-16 RX ORDER — POLYETHYLENE GLYCOL 3350 17 G/17G
17 POWDER, FOR SOLUTION ORAL
Qty: 0 | Refills: 0 | DISCHARGE
Start: 2022-09-16

## 2022-09-16 RX ORDER — ASPIRIN/CALCIUM CARB/MAGNESIUM 324 MG
1 TABLET ORAL
Qty: 0 | Refills: 0 | DISCHARGE

## 2022-09-16 RX ORDER — FINASTERIDE 5 MG/1
5 TABLET, FILM COATED ORAL DAILY
Refills: 0 | Status: DISCONTINUED | OUTPATIENT
Start: 2022-09-16 | End: 2022-09-17

## 2022-09-16 RX ORDER — ASPIRIN/CALCIUM CARB/MAGNESIUM 324 MG
1 TABLET ORAL
Qty: 0 | Refills: 0 | DISCHARGE
Start: 2022-09-16

## 2022-09-16 RX ORDER — PANTOPRAZOLE SODIUM 20 MG/1
1 TABLET, DELAYED RELEASE ORAL
Qty: 0 | Refills: 0 | DISCHARGE
Start: 2022-09-16

## 2022-09-16 RX ORDER — ALLOPURINOL 300 MG
300 TABLET ORAL DAILY
Refills: 0 | Status: DISCONTINUED | OUTPATIENT
Start: 2022-09-16 | End: 2022-09-17

## 2022-09-16 RX ORDER — OXYCODONE HYDROCHLORIDE 5 MG/1
1 TABLET ORAL
Qty: 0 | Refills: 0 | DISCHARGE
Start: 2022-09-16

## 2022-09-16 RX ORDER — CELECOXIB 200 MG/1
1 CAPSULE ORAL
Qty: 0 | Refills: 0 | DISCHARGE
Start: 2022-09-16

## 2022-09-16 RX ADMIN — CELECOXIB 200 MILLIGRAM(S): 200 CAPSULE ORAL at 06:30

## 2022-09-16 RX ADMIN — CELECOXIB 200 MILLIGRAM(S): 200 CAPSULE ORAL at 18:47

## 2022-09-16 RX ADMIN — Medication 81 MILLIGRAM(S): at 05:36

## 2022-09-16 RX ADMIN — CELECOXIB 200 MILLIGRAM(S): 200 CAPSULE ORAL at 17:47

## 2022-09-16 RX ADMIN — Medication 100 MILLIGRAM(S): at 03:59

## 2022-09-16 RX ADMIN — Medication 15 MILLIGRAM(S): at 12:53

## 2022-09-16 RX ADMIN — FINASTERIDE 5 MILLIGRAM(S): 5 TABLET, FILM COATED ORAL at 21:20

## 2022-09-16 RX ADMIN — POLYETHYLENE GLYCOL 3350 17 GRAM(S): 17 POWDER, FOR SOLUTION ORAL at 21:21

## 2022-09-16 RX ADMIN — CELECOXIB 200 MILLIGRAM(S): 200 CAPSULE ORAL at 05:36

## 2022-09-16 RX ADMIN — Medication 81 MILLIGRAM(S): at 17:46

## 2022-09-16 RX ADMIN — Medication 15 MILLIGRAM(S): at 06:00

## 2022-09-16 RX ADMIN — Medication 300 MILLIGRAM(S): at 21:20

## 2022-09-16 RX ADMIN — PANTOPRAZOLE SODIUM 40 MILLIGRAM(S): 20 TABLET, DELAYED RELEASE ORAL at 05:37

## 2022-09-16 RX ADMIN — Medication 15 MILLIGRAM(S): at 00:30

## 2022-09-16 RX ADMIN — Medication 15 MILLIGRAM(S): at 05:37

## 2022-09-16 RX ADMIN — Medication 15 MILLIGRAM(S): at 00:12

## 2022-09-16 RX ADMIN — Medication 1 TABLET(S): at 12:53

## 2022-09-16 RX ADMIN — Medication 15 MILLIGRAM(S): at 13:15

## 2022-09-16 NOTE — DISCHARGE NOTE PROVIDER - NSDCMRMEDTOKEN_GEN_ALL_CORE_FT
allopurinol 300 mg oral tablet: 1 tab(s) orally once a day  Aspir 81 oral delayed release tablet: 1 tab(s) orally once a day  enalapril 20 mg oral tablet: 1 tab(s) orally once a day  finasteride 5 mg oral tablet: 1 tab(s) orally once a day  rosuvastatin 40 mg oral tablet: 1 tab(s) orally once a day   acetaminophen 325 mg oral tablet: 3 tab(s) orally every 8 hours  allopurinol 300 mg oral tablet: 1 tab(s) orally once a day  aspirin 81 mg oral tablet, chewable: 1 tab(s) orally 2 times a day  celecoxib 200 mg oral capsule: 1 cap(s) orally every 12 hours  enalapril 20 mg oral tablet: 1 tab(s) orally once a day. If you become dizzy once you are home, call your primary care physician regarding this medication  finasteride 5 mg oral tablet: 1 tab(s) orally once a day  oxyCODONE 5 mg oral tablet: 1 tab(s) orally every 4 hours, As needed, Moderate Pain (4 - 6)  pantoprazole 40 mg oral delayed release tablet: 1 tab(s) orally once a day (before a meal)  polyethylene glycol 3350 oral powder for reconstitution: 17 gram(s) orally once a day (at bedtime)  rosuvastatin 40 mg oral tablet: 1 tab(s) orally once a day

## 2022-09-16 NOTE — DISCHARGE NOTE PROVIDER - CARE PROVIDER_API CALL
Ger Zuniga)  Orthopedics  [address] [facility], [place] [address]  Phone: [phone]  Fax: [phone]  Follow Up Time:

## 2022-09-16 NOTE — PROGRESS NOTE ADULT - SUBJECTIVE AND OBJECTIVE BOX
Ortho Note    Pt comfortable without complaints, pain relatively well controlled. He reports feeling lightheaded when standing yesterday though today he made it to the bathroom moving very slowly, without experiencing dizziness. Drank spoiled milk this AM and feels a little stomach discomfort.   Denies CP, SOB, N/V, new numbness/tingling.  Tolerating PO intake. Voiding without complication. No BM but passing flatus.    Vital Signs Last 24 Hrs  T(C): 36.6 (09-16-22 @ 09:06), Max: 36.6 (09-16-22 @ 09:06)  T(F): 97.9 (09-16-22 @ 09:06), Max: 97.9 (09-16-22 @ 09:06)  HR: 103 (09-16-22 @ 09:06) (103 - 103)  BP: 115/72 (09-16-22 @ 09:06) (115/72 - 115/72)  BP(mean): --  RR: 17 (09-16-22 @ 09:06) (17 - 17)  SpO2: 97% (09-16-22 @ 09:06) (97% - 97%)  I&O's Summary    15 Sep 2022 07:01  -  16 Sep 2022 07:00  --------------------------------------------------------  IN: 628 mL / OUT: 450 mL / NET: 178 mL        General: Pt Alert and oriented, NAD  DSG C/D/I- Aquacel  Pulses: DP2+ bilat LE  Sensation: general sensation to light touch intact bilat LE  Motor: EHL/FHL/TA/GS 5/5 bilat LE                          12.3   12.09 )-----------( 179      ( 16 Sep 2022 06:31 )             37.3     09-16    136  |  101  |  25<H>  ----------------------------<  142<H>  4.3   |  28  |  1.15    Ca    8.9      16 Sep 2022 06:31        A/P: 72yMale s/p R TKA, 9/15/22, Dr. Zuniga  - Mateo  - Pain Control  - DVT PPx: Aspirin 81mg BID, SCDs  - PT: WBAT  - Dispo: home pending PT clear    Ortho Pager [phone]

## 2022-09-16 NOTE — DISCHARGE NOTE PROVIDER - NSDCCPCAREPLAN_GEN_ALL_CORE_FT
PRINCIPAL DISCHARGE DIAGNOSIS  Diagnosis: Osteoarthritis of right knee  Assessment and Plan of Treatment:        PRINCIPAL DISCHARGE DIAGNOSIS  Diagnosis: Osteoarthritis of right knee  Assessment and Plan of Treatment: s/p R tKR

## 2022-09-16 NOTE — PROGRESS NOTE ADULT - SUBJECTIVE AND OBJECTIVE BOX
Ortho Note    Pt seen and examined on morning rounds. Pt comfortable without complaints, pain controlled.  Denies CP, SOB, N/V, numbness/tingling     Vital Signs Last 24 Hrs  T(C): 36.7 (09-16-22 @ 04:40), Max: 37.2 (09-16-22 @ 00:26)  T(F): 98.1 (09-16-22 @ 04:40), Max: 98.9 (09-16-22 @ 00:26)  HR: 93 (09-16-22 @ 04:40) (93 - 97)  BP: 150/79 (09-16-22 @ 04:40) (115/68 - 150/79)  BP(mean): --  RR: 16 (09-16-22 @ 04:40) (15 - 16)  SpO2: 97% (09-16-22 @ 04:40) (97% - 97%)  I&O's Summary    15 Sep 2022 07:01  -  16 Sep 2022 06:17  --------------------------------------------------------  IN: 628 mL / OUT: 450 mL / NET: 178 mL        General: Pt Alert and oriented, NAD  DSG C/D/I: Prineo with Aquacel   Pulses: 2+ DP/PT  Sensation: SILT LE bilat   Motor: Quad/Ham/EHL/FHL/TA/GS 5/5            A/P: 72yMale POD#0 s/p R TKA (robotically assisted)   - Stable  - Pain Control: IV and PO  - DVT ppx: ASA 81mg bid  - Post op abx: Anecf  - PT, WBS: WBAT RLE   - HPT pending PT clearance    Fitz Lucero, PGY-2  Ortho Pager [phone]

## 2022-09-16 NOTE — DISCHARGE NOTE PROVIDER - NSDCFUADDINST_GEN_ALL_CORE_FT
**See Dr. Zuniga's paper discharge instructions.**  You have an aquacel dressing that is water resistant. You may remove your dressing after 7 days and leave your incision open to air.   Take your prescriptions as they were prescribed. See the instructions on the individual bottles. Your prescriptions were sent to [facility] on the 1st floor of [facility].  **SEE DR. HALL'S DISCHARGE INSTRUCTION SHEET**    ACTIVITY:  - Weightbear as tolerated with assistive device. No strenuous activity, heavy lifting, driving or returning to work until cleared by MD.  - You may experience postoperative swelling on the operative extremity. You may ice the surgery site for 20 minute intervals.   - If you have had a total knee replacement, do not place pillows or bolsters underneath the knee. Continuously work on straightening the leg postoperatively. This is very important the first few weeks postoperatively.    DRESSING: Aquacel (brown waxy dressing). Water resistant. Avoid letting water hit the dressing directly. No submerging your incision in sitting water (ie tubs, pools, jacuzzis, etc) until cleared by your surgeon.  - Follow dressing/incision care instructions on Dr. Hall's discharge sheet.   - Keep your incision clean and dry. Do not pick at your incision. Do not apply creams, ointments or oils to your incision until cleared by your surgeon. Do not soak your incision in sitting water (ie tubs, pools, lakes, etc.) until cleared by your surgeon. You may let clean, running water fall over your incision.    MEDICATION/ANTICOAGULATION:  -You have been prescribed Aspirin 81mg twice daily as a preventative to help prevent postoperative blood clots. Please take this medication as prescribed.   - You have been prescribed medications for pain:    - Tylenol. 1,000mg every 8 hours. Do not exceed 3,000mg daily. This medication is most effective taken on a routine schedule the first 1-2 weeks after surgery.     - For more severe pain, take Tylenol with the addition of narcotic pain medication. Take this medication as prescribed. This medication may cause drowsiness or dizziness. Do not operate machinery. This medication may cause constipation.  - For all other medications, follow instructions on medication bottle or refer to Dr. Hall's discharge instruction sheet.   - Try to have regular bowel movements. Take stool softener or laxative if necessary. You may wish to take Miralax daily until you have regular bowel movements.   - If you have been prescribed Aspirin or an anti-inflammatory, please take Pantoprazole 40mg once a day, before breakfast, until no longer taking Aspirin or anti-inflammatory. This will help protect your stomach.  - If you have a pain management physician, please follow-up with them postoperatively.   - If you experience any negative side effects of your medications, please call your surgeon's office to discuss.    Follow-up:  - You have a follow-up appointment scheduled with Dr. Hall on 9/27/22. If you have staples or sutures they will be removed in office.  - Please follow-up with your primary care physician or any other specialist you see postoperatively, if needed.   - Contact your doctor if you experience: fever greater than 101.5, chills, chest pain, difficulty breathing, redness or excessive drainage around the incision, other concerns.

## 2022-09-16 NOTE — DISCHARGE NOTE NURSING/CASE MANAGEMENT/SOCIAL WORK - PATIENT PORTAL LINK FT
Video visit 12/07/2020  Last filled Bupropion 01/08/2021   You can access the FollowMyHealth Patient Portal offered by [facility] by registering at the following website: [email or web address]. By joining [facility]’s FollowMyHealth portal, you will also be able to view your health information using other applications (apps) compatible with our system.

## 2022-09-16 NOTE — DISCHARGE NOTE PROVIDER - HOSPITAL COURSE
Admit to Orthopaedics for right total knee replacement   Perioperative Antibiotics  DVT prophylaxis  Physical Therapy  Pain Management Admit to Orthopaedics for right total knee replacement, 9/15/22  Surgery: Right total knee replacement, 9/15/22  Perioperative Antibiotics: Ancef  DVT prophylaxis: SCDs, Aspirin 81mg twice daily  Physical Therapy  Pain Management

## 2022-09-16 NOTE — DISCHARGE NOTE PROVIDER - NSDCCPTREATMENT_GEN_ALL_CORE_FT
PRINCIPAL PROCEDURE  Procedure: Right total knee arthroplasty  Findings and Treatment:        PRINCIPAL PROCEDURE  Procedure: Right total knee arthroplasty  Findings and Treatment: osteoarthritis of the right knee

## 2022-09-16 NOTE — DISCHARGE NOTE PROVIDER - NSDCFUSCHEDAPPT_GEN_ALL_CORE_FT
[facility]  ORTHOSURG [address]  Scheduled Appointment: 10/04/2022     [facility]  ORTHOSURG [address]  Scheduled Appointment: 09/27/2022

## 2022-09-17 VITALS
TEMPERATURE: 98 F | SYSTOLIC BLOOD PRESSURE: 110 MMHG | HEART RATE: 80 BPM | OXYGEN SATURATION: 99 % | RESPIRATION RATE: 17 BRPM | DIASTOLIC BLOOD PRESSURE: 69 MMHG

## 2022-09-17 LAB
ANION GAP SERPL CALC-SCNC: 5 MMOL/L — SIGNIFICANT CHANGE UP (ref 5–17)
BUN SERPL-MCNC: 35 MG/DL — HIGH (ref 7–23)
CALCIUM SERPL-MCNC: 8.4 MG/DL — SIGNIFICANT CHANGE UP (ref 8.4–10.5)
CHLORIDE SERPL-SCNC: 106 MMOL/L — SIGNIFICANT CHANGE UP (ref 96–108)
CO2 SERPL-SCNC: 26 MMOL/L — SIGNIFICANT CHANGE UP (ref 22–31)
CREAT SERPL-MCNC: 1.24 MG/DL — SIGNIFICANT CHANGE UP (ref 0.5–1.3)
EGFR: 62 ML/MIN/1.73M2 — SIGNIFICANT CHANGE UP
GLUCOSE SERPL-MCNC: 120 MG/DL — HIGH (ref 70–99)
HCT VFR BLD CALC: 32.2 % — LOW (ref 39–50)
HGB BLD-MCNC: 10.3 G/DL — LOW (ref 13–17)
MCHC RBC-ENTMCNC: 30.5 PG — SIGNIFICANT CHANGE UP (ref 27–34)
MCHC RBC-ENTMCNC: 32 GM/DL — SIGNIFICANT CHANGE UP (ref 32–36)
MCV RBC AUTO: 95.3 FL — SIGNIFICANT CHANGE UP (ref 80–100)
NRBC # BLD: 0 /100 WBCS — SIGNIFICANT CHANGE UP (ref 0–0)
PLATELET # BLD AUTO: 151 K/UL — SIGNIFICANT CHANGE UP (ref 150–400)
POTASSIUM SERPL-MCNC: 4.7 MMOL/L — SIGNIFICANT CHANGE UP (ref 3.5–5.3)
POTASSIUM SERPL-SCNC: 4.7 MMOL/L — SIGNIFICANT CHANGE UP (ref 3.5–5.3)
RBC # BLD: 3.38 M/UL — LOW (ref 4.2–5.8)
RBC # FLD: 13.7 % — SIGNIFICANT CHANGE UP (ref 10.3–14.5)
SODIUM SERPL-SCNC: 137 MMOL/L — SIGNIFICANT CHANGE UP (ref 135–145)
WBC # BLD: 6.45 K/UL — SIGNIFICANT CHANGE UP (ref 3.8–10.5)
WBC # FLD AUTO: 6.45 K/UL — SIGNIFICANT CHANGE UP (ref 3.8–10.5)

## 2022-09-17 PROCEDURE — 85730 THROMBOPLASTIN TIME PARTIAL: CPT

## 2022-09-17 PROCEDURE — 86803 HEPATITIS C AB TEST: CPT

## 2022-09-17 PROCEDURE — 86901 BLOOD TYPING SEROLOGIC RH(D): CPT

## 2022-09-17 PROCEDURE — S2900: CPT

## 2022-09-17 PROCEDURE — C1776: CPT

## 2022-09-17 PROCEDURE — 85610 PROTHROMBIN TIME: CPT

## 2022-09-17 PROCEDURE — 85027 COMPLETE CBC AUTOMATED: CPT

## 2022-09-17 PROCEDURE — C1713: CPT

## 2022-09-17 PROCEDURE — 86900 BLOOD TYPING SEROLOGIC ABO: CPT

## 2022-09-17 PROCEDURE — 36415 COLL VENOUS BLD VENIPUNCTURE: CPT

## 2022-09-17 PROCEDURE — 97116 GAIT TRAINING THERAPY: CPT

## 2022-09-17 PROCEDURE — 97161 PT EVAL LOW COMPLEX 20 MIN: CPT

## 2022-09-17 PROCEDURE — 86850 RBC ANTIBODY SCREEN: CPT

## 2022-09-17 PROCEDURE — 73560 X-RAY EXAM OF KNEE 1 OR 2: CPT

## 2022-09-17 PROCEDURE — 80048 BASIC METABOLIC PNL TOTAL CA: CPT

## 2022-09-17 RX ADMIN — Medication 81 MILLIGRAM(S): at 05:09

## 2022-09-17 RX ADMIN — CELECOXIB 200 MILLIGRAM(S): 200 CAPSULE ORAL at 05:09

## 2022-09-17 RX ADMIN — PANTOPRAZOLE SODIUM 40 MILLIGRAM(S): 20 TABLET, DELAYED RELEASE ORAL at 05:09

## 2022-09-17 RX ADMIN — CELECOXIB 200 MILLIGRAM(S): 200 CAPSULE ORAL at 06:00

## 2022-09-17 NOTE — PROGRESS NOTE ADULT - SUBJECTIVE AND OBJECTIVE BOX
Ortho Note    Pt seen and examined on morning rounds. Pt comfortable without complaints, pain controlled.  Denies CP, SOB, N/V, numbness/tingling     Vital Signs Last 24 Hrs  T(C): 36.7 (17 Sep 2022 04:30), Max: 36.9 (16 Sep 2022 20:20)  T(F): 98 (17 Sep 2022 04:30), Max: 98.4 (16 Sep 2022 20:20)  HR: 76 (17 Sep 2022 04:30) (76 - 93)  BP: 111/67 (17 Sep 2022 04:30) (108/60 - 125/63)  BP(mean): --  RR: 17 (17 Sep 2022 04:30) (16 - 17)  SpO2: 96% (17 Sep 2022 04:30) (95% - 97%)      General: Pt Alert and oriented, NAD  DSG C/D/I: Prineo with Aquacel   Pulses: 2+ DP/PT  Sensation: SILT LE bilat   Motor: Quad/Ham/EHL/FHL/TA/GS 5/5            A/P: 72yMale s/p R TKA (robotically assisted) on 9/15.  - Stable  - Pain Control: IV and PO  - DVT ppx: ASA 81mg bid  - Post op abx: Anecf  - PT, WBS: WBAT RLE   - HPT pending PT clearance

## 2022-09-19 DIAGNOSIS — M10.9 GOUT, UNSPECIFIED: ICD-10-CM

## 2022-09-19 DIAGNOSIS — M17.11 UNILATERAL PRIMARY OSTEOARTHRITIS, RIGHT KNEE: ICD-10-CM

## 2022-09-19 DIAGNOSIS — I10 ESSENTIAL (PRIMARY) HYPERTENSION: ICD-10-CM

## 2022-09-19 DIAGNOSIS — E78.5 HYPERLIPIDEMIA, UNSPECIFIED: ICD-10-CM

## 2022-09-19 DIAGNOSIS — I25.10 ATHEROSCLEROTIC HEART DISEASE OF NATIVE CORONARY ARTERY WITHOUT ANGINA PECTORIS: ICD-10-CM

## 2022-09-19 PROBLEM — I63.9 CEREBRAL INFARCTION, UNSPECIFIED: Chronic | Status: ACTIVE | Noted: 2022-09-14

## 2022-09-21 ENCOUNTER — APPOINTMENT (OUTPATIENT)
Dept: ORTHOPEDIC SURGERY | Facility: CLINIC | Age: 72
End: 2022-09-21

## 2022-09-21 PROCEDURE — 99024 POSTOP FOLLOW-UP VISIT: CPT

## 2022-09-27 ENCOUNTER — APPOINTMENT (OUTPATIENT)
Dept: ORTHOPEDIC SURGERY | Facility: CLINIC | Age: 72
End: 2022-09-27

## 2022-09-27 VITALS
OXYGEN SATURATION: 99 % | DIASTOLIC BLOOD PRESSURE: 70 MMHG | SYSTOLIC BLOOD PRESSURE: 146 MMHG | WEIGHT: 190 LBS | BODY MASS INDEX: 28.14 KG/M2 | HEIGHT: 69 IN | HEART RATE: 72 BPM

## 2022-09-27 PROCEDURE — 99024 POSTOP FOLLOW-UP VISIT: CPT

## 2022-09-28 NOTE — HISTORY OF PRESENT ILLNESS
[de-identified] : first post op for right total knee arthroplasty , surgical date 09/15/2022  [de-identified] : 71 y/o male presents about 1 week s/p R TKA for wound evaluation. States blisters first appeared on his lower leg on Saturday and have progressed since then. He reports lower extremity numbness and inability to bend his knee. Reports that his surgical pain is mild and only taking Tylenol + Celebrex as needed for pain relief. He has not yet participated in PT. Ambulates with a walker today. [de-identified] : Right knee:\par Well healing surgical incision. Several  anteromedial tibial roofed and unroofed blisters at different stages of healing. Watery yellow non purulent expressible drainage. No surrounding erythema [de-identified] : 1 week s/p RTKA with  multiple tibial blisters [de-identified] : - Femoral and Tibial sutures removed today\par - Tibial blisters unroofed and cleaned with saline water. Silver sulfadine was applied to blisters and covered with a film of Xeroform. Lower leg was wrapped in Webril followed by ace compression bandage. Discussed with the patient to continue the regimen performed on office daily following showering until further evaluation. All supplies were provided to the patient and he agreed to comply.\par - Followup in 1 week

## 2022-09-28 NOTE — HISTORY OF PRESENT ILLNESS
[de-identified] : post op visit for right knee / tibial blister wound check , surgical date 09/15/2022 [de-identified] : Following up for wound check of several large postoperative blisters. All of the blisters have ruptured and decompressed. He has been using topical Silvadene and changing Xeroform/gauze dressings daily per instructions rendered last week. The leg is still swollen but considerably less so than before; he estimates that he has lost 10-12 pounds of water weight in the last 1-2 weeks. No systemic symptoms. [de-identified] : Right knee midline incision well approximated, benign appearing. Many large decompressed superficial blisters mostly inferior to the incision and along the anteromedial tibia, with moist bases and mild serous drainage from the most inferior one. No cellulitis or fluctuance. Moderate 2+ pitting edema throughout leg. Ambulating with walker, not antalgic. [de-identified] : 73y/o male 12 days s/p R TKA with significant postoperative blistering, gradually improving with local wound care [de-identified] : Continue to hold PT\par Ice/elevate\par Cont daily Silvadene treatment\par RTC next week for wound recheck. Initiate OPT once blisters have fully re-epithelialized

## 2022-09-28 NOTE — HISTORY OF PRESENT ILLNESS
[de-identified] : first post op for right total knee arthroplasty , surgical date 09/15/2022  [de-identified] : 73 y/o male presents about 1 week s/p R TKA for wound evaluation. States blisters first appeared on his lower leg on Saturday and have progressed since then. He reports lower extremity numbness and inability to bend his knee. Reports that his surgical pain is mild and only taking Tylenol + Celebrex as needed for pain relief. He has not yet participated in PT. Ambulates with a walker today. [de-identified] : Right knee:\par Well healing surgical incision. Several  anteromedial tibial roofed and unroofed blisters at different stages of healing. Watery yellow non purulent expressible drainage. No surrounding erythema [de-identified] : 1 week s/p RTKA with  multiple tibial blisters [de-identified] : - Femoral and Tibial sutures removed today\par - Tibial blisters unroofed and cleaned with saline water. Silver sulfadine was applied to blisters and covered with a film of Xeroform. Lower leg was wrapped in Webril followed by ace compression bandage. Discussed with the patient to continue the regimen performed on office daily following showering until further evaluation. All supplies were provided to the patient and he agreed to comply.\par - Followup in 1 week

## 2022-09-28 NOTE — HISTORY OF PRESENT ILLNESS
[de-identified] : post op visit for right knee / tibial blister wound check , surgical date 09/15/2022 [de-identified] : Following up for wound check of several large postoperative blisters. All of the blisters have ruptured and decompressed. He has been using topical Silvadene and changing Xeroform/gauze dressings daily per instructions rendered last week. The leg is still swollen but considerably less so than before; he estimates that he has lost 10-12 pounds of water weight in the last 1-2 weeks. No systemic symptoms. [de-identified] : Right knee midline incision well approximated, benign appearing. Many large decompressed superficial blisters mostly inferior to the incision and along the anteromedial tibia, with moist bases and mild serous drainage from the most inferior one. No cellulitis or fluctuance. Moderate 2+ pitting edema throughout leg. Ambulating with walker, not antalgic. [de-identified] : 73y/o male 12 days s/p R TKA with significant postoperative blistering, gradually improving with local wound care [de-identified] : Continue to hold PT\par Ice/elevate\par Cont daily Silvadene treatment\par RTC next week for wound recheck. Initiate OPT once blisters have fully re-epithelialized

## 2022-10-04 ENCOUNTER — APPOINTMENT (OUTPATIENT)
Dept: ORTHOPEDIC SURGERY | Facility: CLINIC | Age: 72
End: 2022-10-04

## 2022-10-04 DIAGNOSIS — M17.0 BILATERAL PRIMARY OSTEOARTHRITIS OF KNEE: ICD-10-CM

## 2022-10-04 DIAGNOSIS — M17.12 UNILATERAL PRIMARY OSTEOARTHRITIS, LEFT KNEE: ICD-10-CM

## 2022-10-04 PROCEDURE — 99024 POSTOP FOLLOW-UP VISIT: CPT

## 2022-10-04 NOTE — ADDENDUM
[FreeTextEntry1] : Documented by Agustina Jamison acting as a scribe for Dr. Ger Zuniga on 10/04/2022.

## 2022-10-04 NOTE — END OF VISIT
[FreeTextEntry3] : All medical record entries made by the Scribe were at my, Dr. Ger Zuniga, direction and personally dictated by me on 10/04/2022. I have reviewed the chart and agree that the record accurately reflects my personal performance of the history, physical exam, assessment and plan. I have also personally directed, reviewed, and agreed with the chart.

## 2022-10-04 NOTE — HISTORY OF PRESENT ILLNESS
[de-identified] : 3rd POA s/p R TKA 9/15/22 [de-identified] : 10/4/2022: Following up for wound check following total knee arthroplasty with significant blistering post-op. He has been compliant with daily wound care. He reports resolution of the wet blisters and has no residual drainage. He takes Motrin as needed, infrequently. His wife has been keeping him in bed most of the time. Uses a walker but doesn't feel it necessary. [de-identified] : General appearance: well nourished and hydrated, pleasant, alert and oriented x 3, cooperative.  \par HEENT: normocephalic, EOM intact, wearing mask, external auditory canal clear.  \par Cardiovascular: no lower leg edema, no varicosities, dorsalis pedis pulses palpable and symmetric.  \par Lymphatics: no palpable lymphadenopathy, no lymphedema.  \par Neurologic: sensation is normal, no muscle weakness in upper or lower extremities, patella tendon reflexes present and symmetric.  \par Dermatologic: skin moist, warm, no rash.  \par Spine: cervical spine with normal lordosis and painless range of motion, thoracic spine with normal kyphosis and painless range of motion, lumbosacral spine with normal lordosis and painless range of motion.  No tenderness to palpation along midline spine and paraspinal musculature.  Sacroiliac joints nontender bilaterally. Negative SLR and crossed SLR tests bilaterally.\par Gait: ambulates with a walker, not demonstrating antalgia but clearly still cautious on the right\par \par Right knee:\par - Focal soft tissue swelling: diffuse throughout knee, leg, ankle; improved from prior\par - Ecchymosis: none\par - Erythema: none\par - Effusion: moderate\par - Wounds: surgical incision is well-healed and benign appearing. All of the previously noted blisters have reepithelialized. All of the skin is dry at this time. There is no active drainage. There is no cellulitis or purulence at any site. The tibial and femoral stab incisions are well-healed.  \par - Alignment: normal\par - Tenderness: none\par - ROM: 3-75\par - Collateral laxity: none\par - Cruciate laxity: none \par - Quad strength: 5/5 [de-identified] : 71 y/o male 2.5 weeks s/p right total knee arthroplasty with resolved early blistering and mild arthrofibrosis [de-identified] : -  Right lower leg resolved blisters healed. D/C with local wound care. Continue to shower as normal.\par - Will RTC in 4 weeks with repeat XR of the right knee\par - PT 3x a week. Discussed with patient that he should begin outpatient therapy this week.\par - HEP\par - Followup in 4 weeks with right knee XRs

## 2022-10-25 ENCOUNTER — OUTPATIENT (OUTPATIENT)
Dept: OUTPATIENT SERVICES | Facility: HOSPITAL | Age: 72
LOS: 1 days | End: 2022-10-25
Payer: MEDICARE

## 2022-10-25 ENCOUNTER — APPOINTMENT (OUTPATIENT)
Dept: ORTHOPEDIC SURGERY | Facility: CLINIC | Age: 72
End: 2022-10-25

## 2022-10-25 ENCOUNTER — RESULT REVIEW (OUTPATIENT)
Age: 72
End: 2022-10-25

## 2022-10-25 VITALS — DIASTOLIC BLOOD PRESSURE: 87 MMHG | SYSTOLIC BLOOD PRESSURE: 163 MMHG | OXYGEN SATURATION: 96 % | HEART RATE: 88 BPM

## 2022-10-25 DIAGNOSIS — Z98.49 CATARACT EXTRACTION STATUS, UNSPECIFIED EYE: Chronic | ICD-10-CM

## 2022-10-25 DIAGNOSIS — Z98.890 OTHER SPECIFIED POSTPROCEDURAL STATES: Chronic | ICD-10-CM

## 2022-10-25 PROCEDURE — 73564 X-RAY EXAM KNEE 4 OR MORE: CPT | Mod: 26,RT

## 2022-10-25 PROCEDURE — 99024 POSTOP FOLLOW-UP VISIT: CPT

## 2022-10-25 PROCEDURE — 73564 X-RAY EXAM KNEE 4 OR MORE: CPT

## 2022-10-25 NOTE — HISTORY OF PRESENT ILLNESS
[de-identified] : 4th POA S/P R TKA 09/15/2022 , surgical date 09/15/2022  [de-identified] : 10/25/2022: 71 y/o male f/u s/p right TKA 9/15/22. He reports good interval improvements since his last visit. His pain is minimal and well-controlled without opioids. He participates in PT twice a week and has noted good improvement in his ROM. He is ambulating with a cane.  [de-identified] : Right knee: \par - Focal soft tissue swelling: There is mild residual 1+ pitting edema which has significantly improved from prior. \par - Ecchymosis: none\par - Erythema: none\par - Effusion: moderate\par - Wounds: well healed surgical incision and femoral and tibial stab incisions which are all benign appearing. The blistered areas have all reepithelialized nicely. There is no cellulitis, though there is some chronic skin darkening. \par - Alignment: normal\par - ROM: 3-100\par - Collateral laxity: none\par - Cruciate laxity: none\par - Popliteal angle (degrees): 35\par - Quad strength: 5/5 [de-identified] : Right knee x-rays taken today demonstrate a well-fixed right TKA in unchanged alignment as compared to previous imaging without evidence of mechanical complication. \par Patellar height: normal\par Patellar tracking: central [de-identified] : 73 y/o male 6 weeks s/p right total knee arthroplasty now with resolved superficial wound issues and back on normal post-op schedule.  [de-identified] : - He was advised to continue with PT and home exercise program with Tylenol and Motrin as needed. \par - He was advised to wean the cane as tolerated and will f/u a week before his planned departure date with repeat left knee x-rays.  \par - I will furnish him with a letter clearing him for air travel per his repeated requests.

## 2022-10-25 NOTE — END OF VISIT
[FreeTextEntry3] : All medical record entries made by the Scribe were at my, Dr. Ger Zuniga, direction and personally dictated by me on 10/25/2022. I have reviewed the chart and agree that the record accurately reflects my personal performance of the history, physical exam, assessment and plan. I have also personally directed, reviewed, and agreed with the chart.

## 2022-10-25 NOTE — ADDENDUM
[FreeTextEntry1] : Documented by Agustina Jamison acting as a scribe for Dr. Ger Zuniga on 10/25/2022.

## 2022-10-27 NOTE — PRE-ANESTHESIA EVALUATION ADULT - NSATTENDATTESTRD_GEN_ALL_CORE
clear to auscultation bilaterally/no wheezes/no respiratory distress
The patient has been re-examined and I agree with the above assessment or I updated with my findings.
clear to auscultation bilaterally/no wheezes/no respiratory distress

## 2022-11-22 ENCOUNTER — RESULT REVIEW (OUTPATIENT)
Age: 72
End: 2022-11-22

## 2022-11-22 ENCOUNTER — OUTPATIENT (OUTPATIENT)
Dept: OUTPATIENT SERVICES | Facility: HOSPITAL | Age: 72
LOS: 1 days | End: 2022-11-22
Payer: MEDICARE

## 2022-11-22 ENCOUNTER — APPOINTMENT (OUTPATIENT)
Dept: ORTHOPEDIC SURGERY | Facility: CLINIC | Age: 72
End: 2022-11-22

## 2022-11-22 VITALS
OXYGEN SATURATION: 99 % | DIASTOLIC BLOOD PRESSURE: 82 MMHG | TEMPERATURE: 98 F | BODY MASS INDEX: 27.85 KG/M2 | WEIGHT: 188 LBS | SYSTOLIC BLOOD PRESSURE: 166 MMHG | HEIGHT: 69 IN | HEART RATE: 79 BPM

## 2022-11-22 DIAGNOSIS — Z98.890 OTHER SPECIFIED POSTPROCEDURAL STATES: Chronic | ICD-10-CM

## 2022-11-22 DIAGNOSIS — Z98.49 CATARACT EXTRACTION STATUS, UNSPECIFIED EYE: Chronic | ICD-10-CM

## 2022-11-22 PROCEDURE — 73564 X-RAY EXAM KNEE 4 OR MORE: CPT | Mod: 26,LT,RT

## 2022-11-22 PROCEDURE — 73564 X-RAY EXAM KNEE 4 OR MORE: CPT

## 2022-11-22 PROCEDURE — 99024 POSTOP FOLLOW-UP VISIT: CPT

## 2022-11-22 RX ORDER — CELECOXIB 200 MG/1
200 CAPSULE ORAL TWICE DAILY
Qty: 60 | Refills: 2 | Status: ACTIVE | COMMUNITY
Start: 2022-09-14 | End: 1900-01-01

## 2022-11-23 NOTE — END OF VISIT
[FreeTextEntry3] : All medical record entries made by the Scribe were at my, Dr. Ger Zuniga, direction and personally dictated by me on 11/22/2022. I have reviewed the chart and agree that the record accurately reflects my personal performance of the history, physical exam, assessment and plan. I have also personally directed, reviewed, and agreed with the chart.

## 2022-11-23 NOTE — ADDENDUM
[FreeTextEntry1] : Documented by Agustina Jamison acting as a scribe for Dr. Ger Zuniga on 11/22/2022.

## 2022-11-23 NOTE — HISTORY OF PRESENT ILLNESS
[de-identified] : 5th POA S/P R TKA 09/15/2022 , surgical date 09/15/2022. [de-identified] : 11/22/2022: 71 y/o male presenting about 2 months s/p R TKA. He states overall he is doing a lot better. He continues to participate in PT about 2x week. His pain is mild and he takes Celebrex and oxycodone 1-2x week to help him sleep.  [de-identified] :  General appearance: well nourished and hydrated, pleasant, alert and oriented x 3, cooperative.  \par HEENT: normocephalic, EOM intact, wearing mask, external auditory canal clear.  \par Cardiovascular: no lower leg edema, no varicosities, dorsalis pedis pulses palpable and symmetric.  \par Lymphatics: no palpable lymphadenopathy, no lymphedema.  \par Neurologic: sensation is normal, no muscle weakness in upper or lower extremities, patella tendon reflexes present and symmetric.  \par Dermatologic: skin moist, warm, no rash.  \par Spine: cervical spine with normal lordosis and painless range of motion, thoracic spine with normal kyphosis and painless range of motion, lumbosacral spine with normal lordosis and painless range of motion.  No tenderness to palpation along midline spine and paraspinal musculature.  Sacroiliac joints nontender bilaterally. Negative SLR and crossed SLR tests bilaterally.\par Gait: presents with a cane today but is able to demonstrate a stable, non-antalgic gait pattern without it, with normal stride length and marciano \par \par Right knee:\par - Focal soft tissue swelling: diffuse through distal thigh and leg to the ankle\par - Ecchymosis: none\par - Erythema: none\par - Effusion: mild\par - Wounds: well-healed surgical incision, benign appearing. Some ongoing reepithelialization along the distal knee and leg blistering. There is no evidence of cellulitis or fluctuance. His tibial and femoral stab incisions are also well-healed without any concerning appearance. \par - Alignment: normal\par - Tenderness: minimal to medial and lateral joint lines\par - ROM: 3-115\par - Collateral laxity: none\par - Cruciate laxity: none\par - Popliteal angle (degrees): 30\par - Quad strength: 5/5 [de-identified] : 4 radiographic views were taken of bilateral knees. The right TKA is in unchanged position as compared to previous imaging without evidence of mechanical complication. Patella sits at normal height and tracks centrally. Some diffuse, mostly anterior soft tissue swelling is noted. Left knee demonstrates normal alignment, moderate to severe medial compartment OA, KL 3-4. Patella sits at normal height and tracks centrally. Some enthesopathic change is noted over the tibial tubercle consistent with prior Osgood-Schlatter disease.  [de-identified] : 73 y/o male now approximately 9 weeks s/p right TKA.  [de-identified] : - Continue with PT, HEP, Tylenol and Celebrex as needed. \par - I advised him to wean off of the oxycodone as quickly as possible, to which he agreed. \par - He will be traveling to [facility] to establish permanent residency there and will stay for at least 6 months. I asked him to return to the office whenever he finds himself back in [place] for further evaluation. Repeat bilateral knee XR will be taken at that time. \par - We did discuss that he has moderate to severe radiographic evidence of OA on the left side, however given that it is minimally symptomatic at this time, I do not think we need to address it with any invasive management.

## 2023-12-22 ENCOUNTER — OUTPATIENT (OUTPATIENT)
Dept: OUTPATIENT SERVICES | Facility: HOSPITAL | Age: 73
LOS: 1 days | End: 2023-12-22
Payer: MEDICARE

## 2023-12-22 ENCOUNTER — RESULT REVIEW (OUTPATIENT)
Age: 73
End: 2023-12-22

## 2023-12-22 ENCOUNTER — APPOINTMENT (OUTPATIENT)
Dept: ORTHOPEDIC SURGERY | Facility: CLINIC | Age: 73
End: 2023-12-22
Payer: MEDICARE

## 2023-12-22 VITALS
WEIGHT: 190 LBS | OXYGEN SATURATION: 97 % | HEART RATE: 96 BPM | DIASTOLIC BLOOD PRESSURE: 93 MMHG | HEIGHT: 69 IN | SYSTOLIC BLOOD PRESSURE: 175 MMHG | BODY MASS INDEX: 28.14 KG/M2

## 2023-12-22 DIAGNOSIS — Z98.890 OTHER SPECIFIED POSTPROCEDURAL STATES: Chronic | ICD-10-CM

## 2023-12-22 DIAGNOSIS — Z98.49 CATARACT EXTRACTION STATUS, UNSPECIFIED EYE: Chronic | ICD-10-CM

## 2023-12-22 PROCEDURE — 73564 X-RAY EXAM KNEE 4 OR MORE: CPT | Mod: 26,LT,RT

## 2023-12-22 PROCEDURE — 73564 X-RAY EXAM KNEE 4 OR MORE: CPT

## 2023-12-22 PROCEDURE — 99213 OFFICE O/P EST LOW 20 MIN: CPT

## 2023-12-22 NOTE — ASSESSMENT
[FreeTextEntry1] : Imp: 72 y/o M now just over one year status post right total knee arthroplasty as well as minimally symptomatic left knee osteoarthritis with ongoing mild right knee post-operative arthofibrosis.   -We again reviewed the pathophysiology of arhrofibrosis. It's my impression that his lingering knee stiffness was a consequence of his superficial wound issues that he developed initially after his index procedure.  -He has lost some range of motion as compared to the last visit which I think that we can regain with the course of PT which I rewrote him for today. We provided him with multiple recommendations for PT providers, which should hopefully be cost conscious to him.  -We provided him with a new copy of the [facility] Exercise porgram.  -He'll follow up next in 6 months with no new x-rays needed.

## 2023-12-26 NOTE — END OF VISIT
[FreeTextEntry3] : Documented by Yuli Munoz acting as a scribe for Dr. Ger Zuniga.12/22/2023   All medical record entries made by the Scribe were at my, Dr. Ger Zuniga, direction and personally dictated by me on 12/22/2023 . I have reviewed the chart and agree that the record accurately reflects my personal performance of the history, physical exam, assessment andplan. I have also personally directed, reiewed, and agreed with the chart.

## 2023-12-26 NOTE — HISTORY OF PRESENT ILLNESS
[de-identified] : R TKA 9/15/22  12/22/2023: 74 y/o M now presenting about 15 months status post right total knee arthroplasty. Pt notes that his gait pattern is slower than other individuals while walking. He notes difficulty while walking down stairs occasionally. States he's been taking Diclofenac as needed for pain. Has been participating in PT and states he does notice some progression in his range of motion. Pt's specific functional complaints today are the fact that he has difficulty rising from a seated position. He also has difficulty taking a seated position. He reports that he loses strength in his right quadriceps, and he has to drop the last several inches to the seat. He also complains of issues going downstairs, and he feels that his pace of walking should be faster.  9/2/22: Following up for right knee osteoarthritis. Notes very little relief (1 week) from the last HA injection. Seeking R TKA now. Still planning to leave for [facility] in November, though the date is still potentially somewhat flexible. He notes that he will not have any medical coverage for the first 6 months in [facility], and that he will be compelled to remain there for those first 6 months uninterrupted to gain permanent resident status there.   5/4/22: Feels stronger in the right knee following PT and HEP. Has not been using Tylenol or meloxicam. Feels mild pain in the mornings but otherwise denies pain. Feels more "soreness" but mostly complains of inability to descend stairs with the right knee due to buckling. Braces have not helped in the past and he is not interested in trying any others. Would like to explore HA at this point.  1/5/22: 72y/o male presenting for evaluation of R > L knee pain. Symptoms present for about 8 years with exacerbation in the past 3 weeks without injury or other inciting event. Pain is mostly medial at the right knee, worst in the mornings and fades to a mild soreness over the course of the day. Treatments attempted thus far have included a soft right knee brace, heating patches, and HEP consisting of gym-based light weightlifting a couple of times per week. Reports unlimited ambulatory tolerance without assistive device, though he needs to take his time on longer walks. Retired last month. History of right knee arthroscopy 8 years ago for some kind of meniscal lesion. He is interested in discussing "cutting-edge" technologies like meniscal transplantation and autologous chondrocyte grafting.

## 2023-12-26 NOTE — DISCUSSION/SUMMARY
[de-identified] : Imp: 72 y/o M now just over one year status post right total knee arthroplasty as well as minimally symptomatic left knee osteoarthritis with ongoing mild right knee post-operative arthrofibrosis.   -We again reviewed the pathophysiology of arthrofibrosis. It's my impression that his lingering knee stiffness was a consequence of his superficial wound issues that he developed initially after his index procedure.  -He has lost some range of motion as compared to the last visit which I think that we can regain with the course of PT which I rewrote him for today. We provided him with multiple recommendations for PT providers, which should hopefully be cost conscious to him.  -We provided him with a new copy of the [facility] K[facility] Home Exercise program.  -He'll follow up next in 6 months with no new x-rays needed.

## 2023-12-26 NOTE — PHYSICAL EXAM
[de-identified] :  General appearance: well nourished and hydrated, pleasant, alert and oriented x 3, cooperative. HEENT: normocephalic, EOM intact, wearing mask, external auditory canal clear. Cardiovascular: no lower leg edema, no varicosities, dorsalis pedis pulses palpable and symmetric. Lymphatics: no palpable lymphadenopathy, no lymphedema. Neurologic: sensation is normal, no muscle weakness in upper or lower extremities, patella tendon reflexes present and symmetric. Dermatologic: skin moist, warm, no rash. Spine: cervical spine with normal lordosis and painless range of motion, thoracic spine with normal kyphosis and painless range of motion, lumbosacral spine with normal lordosis and painless range of motion.  No tenderness to palpation along midline spine and paraspinal musculature.  Sacroiliac joints nontender bilaterally. Negative SLR and crossed SLR tests bilaterally. Gait: Presents today without the use of any assistive device. He demonstrates a cautious gait pattern. He does not demonstrate any specific antalgia today nor is he demonstrating a limp.   Right knee: - Focal soft tissue swelling: no palpable Baker's cyst - Ecchymosis: none - Erythema: none - Effusion: small residual effusion - Wounds: Well-healed anterior incision, benign appearing. There are the sequelae of extensive anterior blistering on his right anterior shin, all of which are well-healed and benign appearing.  - Alignment: normal - Tenderness: none - ROM: 2-100 - Collateral laxity: none - Cruciate laxity: none - Popliteal angle (degrees): 35 - Quad strength: 5/5. Zero extensor lag   [de-identified] :  AP pelvis and 4 views of the bilateral knees (weightbearing AP, weightbearing Cannon, weightbearing lateral, and Sunrise) were interpreted by me and reviewed with the patient.  Location of imaging: [facility]  Date of exam: 12/22/2023  Right knee --  Alignment: stable position of the right total knee arthroplasty as compared to previous imaging without evidence of mechanical complication Patellar height: normal Patellar tracking: centrally  Left knee --  Alignment: normal Arthritis: tricompartmental osetoarthritis, most pronounced medially. Kellgren & Wilbur 3 Patellar height: normal  Patellar tracking: centrally

## 2024-05-05 ENCOUNTER — NON-APPOINTMENT (OUTPATIENT)
Age: 74
End: 2024-05-05

## 2024-05-07 ENCOUNTER — RESULT REVIEW (OUTPATIENT)
Age: 74
End: 2024-05-07

## 2024-05-07 ENCOUNTER — APPOINTMENT (OUTPATIENT)
Dept: ORTHOPEDIC SURGERY | Facility: CLINIC | Age: 74
End: 2024-05-07
Payer: MEDICARE

## 2024-05-07 ENCOUNTER — OUTPATIENT (OUTPATIENT)
Dept: OUTPATIENT SERVICES | Facility: HOSPITAL | Age: 74
LOS: 1 days | End: 2024-05-07
Payer: MEDICARE

## 2024-05-07 VITALS
SYSTOLIC BLOOD PRESSURE: 154 MMHG | BODY MASS INDEX: 28.14 KG/M2 | HEART RATE: 95 BPM | OXYGEN SATURATION: 95 % | DIASTOLIC BLOOD PRESSURE: 83 MMHG | WEIGHT: 190 LBS | HEIGHT: 69 IN

## 2024-05-07 DIAGNOSIS — Z98.49 CATARACT EXTRACTION STATUS, UNSPECIFIED EYE: Chronic | ICD-10-CM

## 2024-05-07 DIAGNOSIS — Z98.890 OTHER SPECIFIED POSTPROCEDURAL STATES: Chronic | ICD-10-CM

## 2024-05-07 DIAGNOSIS — Z47.1 AFTERCARE FOLLOWING JOINT REPLACEMENT SURGERY: ICD-10-CM

## 2024-05-07 DIAGNOSIS — Z96.651 AFTERCARE FOLLOWING JOINT REPLACEMENT SURGERY: ICD-10-CM

## 2024-05-07 LAB
HCT VFR BLD CALC: 43.6 %
HGB BLD-MCNC: 13.6 G/DL
MCHC RBC-ENTMCNC: 29.4 PG
MCHC RBC-ENTMCNC: 31.2 GM/DL
MCV RBC AUTO: 94.2 FL
PLATELET # BLD AUTO: 183 K/UL
RBC # BLD: 4.63 M/UL
RBC # FLD: 14.3 %
WBC # FLD AUTO: 4.55 K/UL

## 2024-05-07 PROCEDURE — 20610 DRAIN/INJ JOINT/BURSA W/O US: CPT | Mod: RT

## 2024-05-07 PROCEDURE — 99214 OFFICE O/P EST MOD 30 MIN: CPT | Mod: 25

## 2024-05-07 PROCEDURE — 73564 X-RAY EXAM KNEE 4 OR MORE: CPT

## 2024-05-07 PROCEDURE — 73564 X-RAY EXAM KNEE 4 OR MORE: CPT | Mod: 26,LT,RT

## 2024-05-08 LAB
B PERT IGG+IGM PNL SER: ABNORMAL
COLOR FLD: NORMAL
CRP SERPL-MCNC: <3 MG/L
EOSINOPHIL # FLD MANUAL: 0 %
ERYTHROCYTE [SEDIMENTATION RATE] IN BLOOD BY WESTERGREN METHOD: 28 MM/HR
FLUID INTAKE SUBSTANCE CLASS: NORMAL
JOINT PCR PANEL: NOT DETECTED
JOINT PCR SOURCE: NORMAL
LYMPHOCYTES # FLD MANUAL: 22 %
MESOTHL CELL NFR FLD: 0 %
MONOS+MACROS NFR FLD MANUAL: 67 %
NEUTS SEG # FLD MANUAL: 11 %
NRBC # FLD: 0 %
RBC # FLD MANUAL: 2000 /UL
SYCRY CLARITY: ABNORMAL
SYCRY COLOR: ABNORMAL
SYCRY ID: ABNORMAL
SYCRY TUBE: NORMAL
TOTAL CELLS COUNTED FLD: 327 /UL
TUBE TYPE: NORMAL
UNIDENT CELLS NFR FLD MANUAL: 0 %
VARIANT LYMPHS # FLD MANUAL: 0 %

## 2024-05-08 RX ORDER — CELECOXIB 100 MG/1
100 CAPSULE ORAL TWICE DAILY
Qty: 60 | Refills: 2 | Status: ACTIVE | COMMUNITY
Start: 2023-12-22 | End: 1900-01-01

## 2024-05-10 NOTE — ADDENDUM
[FreeTextEntry1] : I, Gonzalo Murry, documented this note as a scribe on behalf of Dr. Ger Zuniga on 05/07/2024.

## 2024-05-10 NOTE — DISCUSSION/SUMMARY
[de-identified] : Imp: 74 year old male now approximately 20 months s/p R TKA with persistent arthrofibrosis and acutely worsening chronic knee pain and quadriceps dysfunction.  - We will commence the painful total knee workup at this time with serum inflammatory/septic markers and right knee aspiration. Aspirate sent for standard septic/inflammatory panel; in-office Synovasure negative. - Will send as well for gallium scan with SPECT according to protocol from the [facility] Nuclear Medicine Department. - We discussed the possibility for surgery for lysis adhesions to address his persistent loss of flexion; however, this would warrant further in-depth discussion in the future should symptoms persist.

## 2024-05-10 NOTE — END OF VISIT
[FreeTextEntry3] : All medical record entries made by the Scribe were at my, Dr. Ger Zuniga, direction and personally dictated by me on 05/07/2024. I have reviewed the chart and agree that the record accurately reflects my personal performance of the history, physical exam, assessment and plan. I have also personally directed, reviewed, and agreed with the chart.

## 2024-05-10 NOTE — HISTORY OF PRESENT ILLNESS
[de-identified] : R TKA 9/15/22  05/07/2024: 74 year old male approximately 20 months s/p R TKA. At last visit in December, he presented with quadriceps weakness and was sent for PT, which he is participating in twice weekly. He reports that the function of the right knee had been stable and acceptable for some time.  However, over the past three weeks, he has had increased knee stiffness and difficulty standing from a seated position.  He reports increased swelling of the knee. He denies any fever or chills. He is taking Celebrex as needed for pain.  12/22/2023: 74 y/o M now presenting about 15 months status post right total knee arthroplasty. Pt notes that his gait pattern is slower than other individuals while walking. He notes difficulty while walking down stairs occasionally. States he's been taking Diclofenac as needed for pain. Has been participating in PT and states he does notice some progression in his range of motion. Pt's specific functional complaints today are the fact that he has difficulty rising from a seated position. He also has difficulty taking a seated position. He reports that he loses strength in his right quadriceps, and he has to drop the last several inches to the seat. He also complains of issues going downstairs, and he feels that his pace of walking should be faster.  9/2/22: Following up for right knee osteoarthritis. Notes very little relief (1 week) from the last HA injection. Seeking R TKA now. Still planning to leave for [facility] in November, though the date is still potentially somewhat flexible. He notes that he will not have any medical coverage for the first 6 months in [facility], and that he will be compelled to remain there for those first 6 months uninterrupted to gain permanent resident status there.   5/4/22: Feels stronger in the right knee following PT and HEP. Has not been using Tylenol or meloxicam. Feels mild pain in the mornings but otherwise denies pain. Feels more "soreness" but mostly complains of inability to descend stairs with the right knee due to buckling. Braces have not helped in the past and he is not interested in trying any others. Would like to explore HA at this point.  1/5/22: 72y/o male presenting for evaluation of R > L knee pain. Symptoms present for about 8 years with exacerbation in the past 3 weeks without injury or other inciting event. Pain is mostly medial at the right knee, worst in the mornings and fades to a mild soreness over the course of the day. Treatments attempted thus far have included a soft right knee brace, heating patches, and HEP consisting of gym-based light weightlifting a couple of times per week. Reports unlimited ambulatory tolerance without assistive device, though he needs to take his time on longer walks. Retired last month. History of right knee arthroscopy 8 years ago for some kind of meniscal lesion. He is interested in discussing "cutting-edge" technologies like meniscal transplantation and autologous chondrocyte grafting.

## 2024-05-10 NOTE — PHYSICAL EXAM
[de-identified] : General appearance: well nourished and hydrated, pleasant, alert and oriented x 3, cooperative.   HEENT: normocephalic, EOM intact, wearing mask, external auditory canal clear.   Cardiovascular: no lower leg edema, no varicosities, dorsalis pedis pulses palpable and symmetric.   Lymphatics: no palpable lymphadenopathy, no lymphedema.   Neurologic: sensation is normal, no muscle weakness in upper or lower extremities, patella tendon reflexes present and symmetric.   Dermatologic: skin moist, warm, no rash.   Spine: cervical spine with normal lordosis and painless range of motion, thoracic spine with normal kyphosis and painless range of motion, lumbosacral spine with normal lordosis and painless range of motion.  No tenderness to palpation along midline spine and paraspinal musculature.  Sacroiliac joints nontender bilaterally. Negative SLR and crossed SLR tests bilaterally. Gait: right-sided caution, without use of assistive device.    Right knee: - Focal soft tissue swelling: none - Ecchymosis: none - Erythema: none - Effusion: small-moderate, no palpable Baker's cyst - Wounds: well-healed midline incision, benign-appearing - Alignment: normal - Tenderness: none - ROM: 0-95 - Collateral laxity: none - Cruciate laxity: none - Popliteal angle (degrees): 45 - Quad strength: 5/5 [de-identified] : Bilateral knee XRs were interpreted by me and reviewed with the patient.  Location of imaging: [facility] Date of exam: 05/07/2024  Right knee --  - Stable appearance of a right total knee arthroplasty as compared to prior imaging without evidence of mechanical complication.  Patellar height: normal Patellar tracking: central - Possible radiolucencies underneath the patellar component, as well as alongside of the posterior aspect of the femoral component, possibly indicative of a septic loosening - A large joint effusion appears to be present  Left knee --  Alignment: normal Arthritis: tricompartmental osteoarthritis, most pronounced in the medial compartment, Kellgren & Wilbur 4 Patellar height: normal Patellar tracking: central

## 2024-05-10 NOTE — PROCEDURE
[de-identified] : Procedure: Knee joint aspiration Laterality: right Indication: diagnostic - discussed with patient Skin prep: alcohol and chlorhexidine Anesthesia: ethyl chloride spray Needle: 18-gauge Portal: superolateral Aspiration: 20cc of normal-appearing synovial fluid Injectate: none Dressing: Band-aid Complications: None  Administered Alpha point-of-care panel, which was read as negative 20-25 minutes post-procedure.

## 2024-05-22 LAB — JOINT CULTURE: NORMAL

## 2024-05-28 ENCOUNTER — RESULT REVIEW (OUTPATIENT)
Age: 74
End: 2024-05-28

## 2024-05-29 ENCOUNTER — APPOINTMENT (OUTPATIENT)
Dept: NUCLEAR MEDICINE | Facility: HOSPITAL | Age: 74
End: 2024-05-29

## 2024-05-29 ENCOUNTER — OUTPATIENT (OUTPATIENT)
Dept: OUTPATIENT SERVICES | Facility: HOSPITAL | Age: 74
LOS: 1 days | End: 2024-05-29
Payer: MEDICARE

## 2024-05-29 DIAGNOSIS — Z98.890 OTHER SPECIFIED POSTPROCEDURAL STATES: Chronic | ICD-10-CM

## 2024-05-30 ENCOUNTER — APPOINTMENT (OUTPATIENT)
Dept: NUCLEAR MEDICINE | Facility: HOSPITAL | Age: 74
End: 2024-05-30

## 2024-05-30 PROCEDURE — A9503: CPT

## 2024-05-30 PROCEDURE — A9556: CPT

## 2024-05-30 PROCEDURE — 78832 RP LOCLZJ TUM SPECT W/CT 2: CPT | Mod: 26

## 2024-05-30 PROCEDURE — 78832 RP LOCLZJ TUM SPECT W/CT 2: CPT

## 2024-06-06 ENCOUNTER — NON-APPOINTMENT (OUTPATIENT)
Age: 74
End: 2024-06-06

## 2024-06-06 RX ORDER — METHYLPREDNISOLONE 4 MG/1
4 TABLET ORAL
Qty: 1 | Refills: 0 | Status: ACTIVE | COMMUNITY
Start: 2024-06-06 | End: 1900-01-01

## 2024-06-10 ENCOUNTER — LABORATORY RESULT (OUTPATIENT)
Age: 74
End: 2024-06-10

## 2024-06-10 ENCOUNTER — APPOINTMENT (OUTPATIENT)
Dept: RHEUMATOLOGY | Facility: CLINIC | Age: 74
End: 2024-06-10
Payer: MEDICARE

## 2024-06-10 VITALS
HEIGHT: 69 IN | BODY MASS INDEX: 28.73 KG/M2 | DIASTOLIC BLOOD PRESSURE: 81 MMHG | OXYGEN SATURATION: 94 % | HEART RATE: 98 BPM | WEIGHT: 194 LBS | TEMPERATURE: 97.3 F | SYSTOLIC BLOOD PRESSURE: 147 MMHG

## 2024-06-10 DIAGNOSIS — M11.261 OTHER CHONDROCALCINOSIS, RIGHT KNEE: ICD-10-CM

## 2024-06-10 DIAGNOSIS — Z87.39 PERSONAL HISTORY OF OTHER DISEASES OF THE MUSCULOSKELETAL SYSTEM AND CONNECTIVE TISSUE: ICD-10-CM

## 2024-06-10 PROCEDURE — 99204 OFFICE O/P NEW MOD 45 MIN: CPT

## 2024-06-10 PROCEDURE — G2211 COMPLEX E/M VISIT ADD ON: CPT | Mod: NC

## 2024-06-10 RX ORDER — OXYCODONE 5 MG/1
5 TABLET ORAL
Qty: 50 | Refills: 0 | Status: DISCONTINUED | COMMUNITY
Start: 2022-09-14 | End: 2024-06-10

## 2024-06-10 RX ORDER — MORPHINE SULFATE 15 MG/1
15 TABLET, FILM COATED, EXTENDED RELEASE ORAL
Qty: 28 | Refills: 0 | Status: DISCONTINUED | COMMUNITY
Start: 2022-09-14 | End: 2024-06-10

## 2024-06-10 RX ORDER — COLCHICINE 0.6 MG/1
0.6 TABLET ORAL TWICE DAILY
Qty: 60 | Refills: 1 | Status: ACTIVE | COMMUNITY
Start: 2024-06-10 | End: 1900-01-01

## 2024-06-10 RX ORDER — PANTOPRAZOLE 40 MG/1
40 TABLET, DELAYED RELEASE ORAL DAILY
Qty: 30 | Refills: 2 | Status: DISCONTINUED | COMMUNITY
Start: 2022-09-14 | End: 2024-06-10

## 2024-06-10 RX ORDER — MELOXICAM 7.5 MG/1
7.5 TABLET ORAL DAILY
Qty: 30 | Refills: 2 | Status: DISCONTINUED | COMMUNITY
Start: 2022-01-05 | End: 2024-06-10

## 2024-06-10 RX ORDER — HYALURONATE SODIUM 20 MG/2 ML
20 SYRINGE (ML) INTRAARTICULAR
Qty: 1 | Refills: 0 | Status: DISCONTINUED | OUTPATIENT
Start: 2022-05-04 | End: 2024-06-10

## 2024-06-11 LAB
ALBUMIN SERPL ELPH-MCNC: 4.4 G/DL
ALP BLD-CCNC: 77 U/L
ALT SERPL-CCNC: 35 U/L
ANION GAP SERPL CALC-SCNC: 11 MMOL/L
AST SERPL-CCNC: 32 U/L
BILIRUB SERPL-MCNC: 0.3 MG/DL
BUN SERPL-MCNC: 22 MG/DL
CALCIUM SERPL-MCNC: 10.3 MG/DL
CALCIUM SERPL-MCNC: 10.3 MG/DL
CHLORIDE SERPL-SCNC: 104 MMOL/L
CO2 SERPL-SCNC: 27 MMOL/L
CREAT SERPL-MCNC: 1.34 MG/DL
CRP SERPL-MCNC: <3 MG/L
EGFR: 56 ML/MIN/1.73M2
ERYTHROCYTE [SEDIMENTATION RATE] IN BLOOD BY WESTERGREN METHOD: 4 MM/HR
GLUCOSE SERPL-MCNC: 117 MG/DL
MAGNESIUM SERPL-MCNC: 2.1 MG/DL
PARATHYROID HORMONE INTACT: 36 PG/ML
POTASSIUM SERPL-SCNC: 5.3 MMOL/L
PROT SERPL-MCNC: 7.3 G/DL
RHEUMATOID FACT SER QL: <10 IU/ML
SODIUM SERPL-SCNC: 142 MMOL/L
URATE SERPL-MCNC: 6.2 MG/DL

## 2024-06-11 NOTE — PHYSICAL EXAM
[General Appearance - Alert] : alert [General Appearance - In No Acute Distress] : in no acute distress [General Appearance - Well-Appearing] : healthy appearing [Sclera] : the sclera and conjunctiva were normal [Examination Of The Oral Cavity] : the lips and gums were normal [No Spinal Tenderness] : no spinal tenderness [] : no rash [Oriented To Time, Place, And Person] : oriented to person, place, and time [Impaired Insight] : insight and judgment were intact [Affect] : the affect was normal [Mood] : the mood was normal [FreeTextEntry1] : right knee with small effusion, minimal warmth, no tenderness.  No erythema.  Decrease in flexion of mary right knee, minimal decrease in extension.  Patient is not able to slowly lower self into sitting position from standing position, drops at end of hip flexion.

## 2024-06-11 NOTE — HISTORY OF PRESENT ILLNESS
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 as well as   Feels as though right knee is swollen and warm to the touch  This week, started medrol dose pack  Also increased Celebrex every day bid taking medrol, since starting feels a little bit better, feels cooled down a bit  Had surgery, 9/15/2022 Can walk 10,0000 steps per day Walking is not a problem,, more with stairs and stepping onto curb Now harder to pedal on the bicycle Feels stride is slower as well Feels as though knee has no strength with pushing off, cannot lower self from standing to sitting position Takes allopurinol 150 mg, for years, had elevated uric acid in past, no gout flares  No colchicine in past Was in [place] for year-year and half No fevers, no chills, no other joints involved

## 2024-06-11 NOTE — ASSESSMENT
[FreeTextEntry1] : 74 year old man referred for rheumatology evaluation.  Patient s/p right knee replacement 9/2022, was doing well until a few months ago when developed right knee pain and weakness, s/p arthrocentesis with extracellular CPPD crystals noted and WBC of the fluid of 327.  Patient was started on medrol dose pack with minimal benefit to date, has two days remaining.  At this time, patient reports more weakness of the right knee rather than pain.  Gallium scan without evidence of hardware loosening although suggests possible inflammatory reaction such as pseudogout, although would expect to see intracellular crystals (reported extracellular) as well as increased WBC count in the synovial fluid as well. Will check labs today in office for other inflammatory arthropathy as well, will check CBC, CMP, ESR, CRP, magnesium, phosphorus, PTH, RF, CCP Lyme and uric acid as well.  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  Would recommend follow up with orthopedist as well

## 2024-06-12 LAB
CCP AB SER IA-ACNC: <8 UNITS
RF+CCP IGG SER-IMP: NEGATIVE

## 2024-07-17 ENCOUNTER — APPOINTMENT (OUTPATIENT)
Dept: ORTHOPEDIC SURGERY | Facility: CLINIC | Age: 74
End: 2024-07-17
Payer: MEDICARE

## 2024-07-17 VITALS
SYSTOLIC BLOOD PRESSURE: 133 MMHG | BODY MASS INDEX: 28.73 KG/M2 | HEIGHT: 69 IN | WEIGHT: 194 LBS | DIASTOLIC BLOOD PRESSURE: 79 MMHG | OXYGEN SATURATION: 95 % | HEART RATE: 96 BPM

## 2024-07-17 DIAGNOSIS — Z96.651 AFTERCARE FOLLOWING JOINT REPLACEMENT SURGERY: ICD-10-CM

## 2024-07-17 DIAGNOSIS — M11.261 OTHER CHONDROCALCINOSIS, RIGHT KNEE: ICD-10-CM

## 2024-07-17 DIAGNOSIS — Z47.1 AFTERCARE FOLLOWING JOINT REPLACEMENT SURGERY: ICD-10-CM

## 2024-07-17 PROCEDURE — 99214 OFFICE O/P EST MOD 30 MIN: CPT

## 2024-07-17 RX ORDER — AMLODIPINE BESYLATE 5 MG/1
5 TABLET ORAL
Refills: 0 | Status: ACTIVE | COMMUNITY

## 2024-12-13 ENCOUNTER — OUTPATIENT (OUTPATIENT)
Dept: OUTPATIENT SERVICES | Facility: HOSPITAL | Age: 74
LOS: 1 days | End: 2024-12-13
Payer: MEDICARE

## 2024-12-13 ENCOUNTER — RESULT REVIEW (OUTPATIENT)
Age: 74
End: 2024-12-13

## 2024-12-13 ENCOUNTER — APPOINTMENT (OUTPATIENT)
Dept: ORTHOPEDIC SURGERY | Facility: CLINIC | Age: 74
End: 2024-12-13

## 2024-12-13 VITALS
WEIGHT: 194 LBS | HEIGHT: 69 IN | HEART RATE: 97 BPM | DIASTOLIC BLOOD PRESSURE: 83 MMHG | OXYGEN SATURATION: 98 % | SYSTOLIC BLOOD PRESSURE: 155 MMHG | BODY MASS INDEX: 28.73 KG/M2

## 2024-12-13 DIAGNOSIS — Z96.651 AFTERCARE FOLLOWING JOINT REPLACEMENT SURGERY: ICD-10-CM

## 2024-12-13 DIAGNOSIS — Z98.49 CATARACT EXTRACTION STATUS, UNSPECIFIED EYE: Chronic | ICD-10-CM

## 2024-12-13 DIAGNOSIS — M17.12 UNILATERAL PRIMARY OSTEOARTHRITIS, LEFT KNEE: ICD-10-CM

## 2024-12-13 DIAGNOSIS — Z98.890 OTHER SPECIFIED POSTPROCEDURAL STATES: Chronic | ICD-10-CM

## 2024-12-13 DIAGNOSIS — Z47.1 AFTERCARE FOLLOWING JOINT REPLACEMENT SURGERY: ICD-10-CM

## 2024-12-13 DIAGNOSIS — M70.62 TROCHANTERIC BURSITIS, LEFT HIP: ICD-10-CM

## 2024-12-13 PROCEDURE — 73502 X-RAY EXAM HIP UNI 2-3 VIEWS: CPT | Mod: 26,LT

## 2024-12-13 PROCEDURE — 99214 OFFICE O/P EST MOD 30 MIN: CPT

## 2024-12-13 PROCEDURE — 73502 X-RAY EXAM HIP UNI 2-3 VIEWS: CPT

## 2025-07-10 ENCOUNTER — TRANSCRIPTION ENCOUNTER (OUTPATIENT)
Age: 75
End: 2025-07-10

## 2025-07-11 ENCOUNTER — TRANSCRIPTION ENCOUNTER (OUTPATIENT)
Age: 75
End: 2025-07-11

## 2025-07-16 ENCOUNTER — APPOINTMENT (OUTPATIENT)
Dept: ORTHOPEDIC SURGERY | Facility: CLINIC | Age: 75
End: 2025-07-16

## (undated) DEVICE — DRSG COBAN 6"

## (undated) DEVICE — WARMING BLANKET UPPER ADULT

## (undated) DEVICE — POLISHER OR CAUTERY TIP

## (undated) DEVICE — NDL HYPO SAFE 22G X 1.5" (BLACK)

## (undated) DEVICE — SYR ASEPTO

## (undated) DEVICE — PACK TOTAL KNEE

## (undated) DEVICE — HOOD T5 PEELAWAY

## (undated) DEVICE — STRYKER 4-PORT MANIFOLD W/SPECIMEN COLLECTION

## (undated) DEVICE — DRAPE U POLY BLUE 60X72"

## (undated) DEVICE — POSITIONER FOAM EGG CRATE ULNAR 2PCS (PINK)

## (undated) DEVICE — NDL 18G BLUNT FILL PINK

## (undated) DEVICE — PREP CHLORAPREP HI-LITE ORANGE 26ML

## (undated) DEVICE — SAW BLADE STRYKER SAGITTAL 25X86.5X1.32MM

## (undated) DEVICE — ROSA NAVITRACKER KIT KNEE/SPINE

## (undated) DEVICE — DRAPE LIGHT HANDLE COVER (BLUE)

## (undated) DEVICE — SUT STRATAFIX SYMMETRIC PDS 1 45CM OS-6

## (undated) DEVICE — MARKING PEN W RULER

## (undated) DEVICE — DRAPE TOWEL BLUE 17" X 24"

## (undated) DEVICE — SUT STRATAFIX SPIRAL PDO 0 24CM CP-2

## (undated) DEVICE — VENODYNE/SCD SLEEVE CALF MEDIUM

## (undated) DEVICE — SUCTION YANKAUER NO CONTROL VENT

## (undated) DEVICE — ROSA DRAPE ROBOTIC UNIT

## (undated) DEVICE — SUT STRATAFIX SPIRAL MONOCRYL PLUS 3-0 30CM PS-1 UNDYED

## (undated) DEVICE — GLV 7 PROTEXIS (WHITE)

## (undated) DEVICE — ELCTR AQUAMANTYS BIPOLAR SEALER 6.0

## (undated) DEVICE — SAW BLADE STRYKER SAGITTAL DUAL CUT TEETH 35X64X.64MM

## (undated) DEVICE — DRAPE 1/2 SHEET 40X57"

## (undated) DEVICE — DRSG STOCKINETTE IMPERVIOUS XL

## (undated) DEVICE — SAW BLADE STRYKER SAGITTAL 81.5X12.5X1.19MM

## (undated) DEVICE — WOUND IRR IRRISEPT W 0.5 CHG

## (undated) DEVICE — SUT VICRYL 0 36" CT-1 UNDYED

## (undated) DEVICE — GLV 7.5 PROTEXIS (WHITE)

## (undated) DEVICE — SUT VICRYL 2-0 27" CT-1 UNDYED

## (undated) DEVICE — SUT STRATAFIX SPIRAL PDO 1 30CM OS-6

## (undated) DEVICE — ELCTR BOVIE PENCIL HANDPIECE ROCKER SWITCH 15FT

## (undated) DEVICE — SYR LUER LOK 50CC